# Patient Record
Sex: MALE | Employment: OTHER | URBAN - METROPOLITAN AREA
[De-identification: names, ages, dates, MRNs, and addresses within clinical notes are randomized per-mention and may not be internally consistent; named-entity substitution may affect disease eponyms.]

---

## 2019-01-25 LAB
ANION GAP SERPL CALCULATED.3IONS-SCNC: 3 MMOL/L (ref 3–14)
BUN SERPL-MCNC: 55 MG/DL (ref 7–30)
CALCIUM SERPL-MCNC: 8.4 MG/DL (ref 8.5–10.1)
CHLORIDE SERPL-SCNC: 96 MMOL/L (ref 94–109)
CO2 SERPL-SCNC: 36 MMOL/L (ref 20–32)
CREAT SERPL-MCNC: 1.99 MG/DL (ref 0.66–1.25)
GFR SERPL CREATININE-BSD FRML MDRD: 33 ML/MIN/{1.73_M2}
GLUCOSE SERPL-MCNC: 309 MG/DL (ref 70–99)
POTASSIUM SERPL-SCNC: 4.4 MMOL/L (ref 3.4–5.3)
SODIUM SERPL-SCNC: 135 MMOL/L (ref 133–144)

## 2019-01-25 PROCEDURE — 80048 BASIC METABOLIC PNL TOTAL CA: CPT | Performed by: FAMILY MEDICINE

## 2019-03-06 LAB — INR PPP: 3.37 (ref 0.86–1.14)

## 2019-03-06 PROCEDURE — 85610 PROTHROMBIN TIME: CPT | Performed by: FAMILY MEDICINE

## 2020-02-11 VITALS
SYSTOLIC BLOOD PRESSURE: 136 MMHG | OXYGEN SATURATION: 90 % | HEART RATE: 74 BPM | TEMPERATURE: 98.1 F | WEIGHT: 315 LBS | RESPIRATION RATE: 20 BRPM | DIASTOLIC BLOOD PRESSURE: 74 MMHG

## 2020-02-11 RX ORDER — ROPINIROLE 0.25 MG/1
0.25 TABLET, FILM COATED ORAL AT BEDTIME
COMMUNITY

## 2020-02-11 RX ORDER — HYDRALAZINE HYDROCHLORIDE 10 MG/1
20 TABLET, FILM COATED ORAL 3 TIMES DAILY
COMMUNITY

## 2020-02-11 RX ORDER — ESOMEPRAZOLE MAGNESIUM 40 MG/1
40 CAPSULE, DELAYED RELEASE ORAL 2 TIMES DAILY
COMMUNITY
End: 2020-02-27

## 2020-02-11 RX ORDER — INSULIN LISPRO 100 U/ML
INJECTION, SOLUTION SUBCUTANEOUS
COMMUNITY

## 2020-02-11 RX ORDER — ALLOPURINOL 100 MG/1
300 TABLET ORAL DAILY
COMMUNITY

## 2020-02-11 RX ORDER — TORSEMIDE 20 MG/1
80 TABLET ORAL DAILY
COMMUNITY
End: 2020-02-14

## 2020-02-11 RX ORDER — INSULIN LISPRO 100 [IU]/ML
55 INJECTION, SUSPENSION SUBCUTANEOUS EVERY EVENING
COMMUNITY

## 2020-02-11 RX ORDER — GABAPENTIN 100 MG/1
100 CAPSULE ORAL 3 TIMES DAILY
COMMUNITY

## 2020-02-11 RX ORDER — INSULIN LISPRO 100 [IU]/ML
160 INJECTION, SUSPENSION SUBCUTANEOUS EVERY MORNING
COMMUNITY

## 2020-02-11 RX ORDER — LIDOCAINE 4 G/G
PATCH TOPICAL
COMMUNITY

## 2020-02-11 RX ORDER — CARVEDILOL 12.5 MG/1
25 TABLET ORAL 2 TIMES DAILY WITH MEALS
COMMUNITY

## 2020-02-11 RX ORDER — MOMETASONE FUROATE 1 MG/G
CREAM TOPICAL DAILY
COMMUNITY

## 2020-02-11 RX ORDER — ISOSORBIDE MONONITRATE 120 MG/1
120 TABLET, EXTENDED RELEASE ORAL EVERY MORNING
COMMUNITY

## 2020-02-11 RX ORDER — EZETIMIBE 10 MG/1
10 TABLET ORAL EVERY MORNING
COMMUNITY

## 2020-02-11 RX ORDER — OXYCODONE HCL 10 MG/1
10 TABLET, FILM COATED, EXTENDED RELEASE ORAL EVERY 12 HOURS
COMMUNITY
End: 2020-02-17

## 2020-02-11 RX ORDER — INSULIN LISPRO 100 [IU]/ML
65 INJECTION, SUSPENSION SUBCUTANEOUS EVERY EVENING
COMMUNITY

## 2020-02-11 RX ORDER — DULOXETIN HYDROCHLORIDE 60 MG/1
60 CAPSULE, DELAYED RELEASE ORAL 2 TIMES DAILY
COMMUNITY

## 2020-02-11 RX ORDER — ATORVASTATIN CALCIUM 80 MG/1
80 TABLET, FILM COATED ORAL DAILY
COMMUNITY

## 2020-02-11 RX ORDER — SENNOSIDES 8.6 MG
2 TABLET ORAL 2 TIMES DAILY
COMMUNITY

## 2020-02-11 RX ORDER — INSULIN LISPRO 100 [IU]/ML
90 INJECTION, SUSPENSION SUBCUTANEOUS EVERY MORNING
COMMUNITY
End: 2020-02-20

## 2020-02-11 NOTE — PROGRESS NOTES
[facility]  PRIMARY CARE PROVIDER AND CLINIC:  Amaya Dawkins MD, MD, [facility] [address] [facility] / [facility]*  Chief Complaint   Patient presents with     Hospital F/U     [facility] Record Number:  [id number]  Place of Service where encounter took place:  [facility] [[id number]]    Valdo Oneill Jr.  is a 69 year old  (1950), admitted to the above facility from  [facility] . Hospital stay 1/23/2020 through 2/10/2020..  Admitted to this facility for  rehab, medical management and nursing care.    HPI:    HPI information obtained from: facility staff, patient report and [facility] chart review.   Brief Summary of Hospital Course:   - Pt with pMH notable for diastolic CHF hospitalized for NSTEMI and acute on chronic CHF diuresed, noted elevated trop, and NSTEMI but felt to be 2/2 demand ischemia, required coronary angiogram followed by stenting x1 to RCA. Losartan and diltiazem stopped.   - complicated with acute on chronic respiratory failure, delirium (2/2 to respiratory condition and / or opioid use), improved.   - Had proteus UTI ,treated with cipro x 5 days.   - hypoglycemia, insulin adjusted, then titrated up back.     Updates on Status Since Skilled nursing Admission:   - Patient was seen and examined today.  Denies orthopnea, PND, chest pain, shortness of breath.  Denies palpitation.  Reports uses Trilogy at home.  Reports he had 17 stents so far.   -RN reports blood glucose today is 450.  Patient denies any Pradaxa, Mccarty UTI, polyphagia.  Reports he takes 1 4 units of insulin in the morning and 125 at noon and 125 at suppertime.    CODE STATUS/ADVANCE DIRECTIVES DISCUSSION:   DNR only  Patient's living condition: lives alone  ALLERGIES: Ceftriaxone and Morphine   Surgical History    Surgery Date Site/Laterality Comments   CABG 1/1/2007 - 12/31/2007        LAPAROSCOPIC ADJUSTABLE GASTRIC BAND 9/17/09    Realize band - Dr. Ferro     PTCA 2005, 2006, 2008, 8/2010   14 stents total     ESOPHAGOGASTRODUODENOSCOPY 10/1/12        Laparoscopic removal of lap band and adjusting port. 1/7/13   Dr. Lewis     OPEN REDUCTION INTERNAL FIXATION ANKLE 10/24/2013  Left      arm avf 1/1/2013 - 12/31/2013 Left      IMO DENTAL SURGERY PROCEDURE 1/1/2014 - 12/31/2014   extractions     ESOPHAGOGASTRODUODENOSCOPY 07/06/2017   with Botox       Medical History    Medical History Date Comments   Pneumonia in other infectious diseases classified elsewhere(484.8) 2006     Coronary atherosclerosis of unspecified type of vessel, native or graft   cabg 2007, neg angio 2/09, stents 8/10, no new stents 4/11   Obstructive sleep apnea (adult) (pediatric)   was on CPAP, then BIPAP, now Trilogy NIV (AVAPS AE)   Mixed hyperlipidemia       Lumbago       Unspecified essential hypertension       Diabetes mellitus (HC)   IDDM   LVH (left ventricular hypertrophy)       Ascending aorta dilatation (HC)   4.5 cm on ECHO 2/16/12   CVA (cerebral infarction)   on MRI 2/10   Diastolic dysfunction, left ventricle   grade II 2/16/12 ECHO   Closed fracture of lower leg 10/17/2013     Obesity, morbid (HC) 10/17/2013     Dermatitis       CKD (chronic kidney disease) stage 4, GFR 15-29 ml/min (HC)   likely underlying diabetic nephropathy + cardiorenal syndrome (also had abrupt worsening in 2007 following CABG raising concerns of cholesterol emboli at that time)   AVF (arteriovenous fistula) (HC)   Left Upper Arm created 10/2013   Myocardial infarction (HC)       Stroke (HC)       CAD (coronary artery disease)       Arthritis       PFO (patent foramen ovale)       Gout       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/2011     Esophagitis       Skin ulcer due to diabetes mellitus (HC)   left heel   CHF (congestive heart failure) (HC)       Closed nondisplaced fracture of head of right radius with delayed healing 11/30/2017     Intermittent dysphagia 5/15/2017     Hx of BKA, left (HC)  2018     MRSA (methicillin resistant Staphylococcus aureus) 2019 & 18 right ear, 18 left knee      Family History    Medical History Relation Name Comments   Other Brother   has 14 siblings, many with diabetes   Good Health Daughter Beatriz     Other Daughter Ellie mental health, addiction issues   Good Health Daughter Mitzi     Stroke Father       Good Health Grandchild   x7   Other Mother   PE age 49   Other Other   nephew uses CPAP   Heart Disease Sister Tova     Sleep apnea Sister Tova uses CPAP   Other Son Frankie coarctation of aorta, blindness, CP, autism     Relation Name Status Comments   Brother         Daughter Beatriz       Daughter Ellie       Daughter Mitzi       Father    (Age 75) Stroke   Grandchild         Mother    (Age 49) Blood clot   Other         Sister Tova Alive     Son Frankie         Social History    Tobacco Use Types Packs/Day Years Used Date   Former Smoker Cigarettes, Pipe 2 13 Quit: 1982   Smokeless Tobacco: Never Used           Tobacco Cessation: Counseling Given: No  Comments: quit in his 30s     Alcohol Use Drinks/Week oz/Week Comments   No 0 Standard drinks or equivalent   0.0 quit drinking in 30s       Post Discharge Medication Reconciliation Status: discharge medications reconciled and changed, per note/orders (see AVS)  Current Outpatient Medications   Medication Sig Dispense Refill     Acetaminophen (TYLENOL PO) Take 650 mg by mouth every 4 hours as needed        allopurinol (ZYLOPRIM) 100 MG tablet Take 300 mg by mouth daily       atorvastatin (LIPITOR) 80 MG tablet Take 80 mg by mouth daily       carvedilol (COREG) 12.5 MG tablet Take 25 mg by mouth 2 times daily (with meals)       cholecalciferol (VITAMIN D3) 5000 UNITS CAPS capsule Take by mouth daily       clopidogrel (PLAVIX) 75 MG tablet Take 75 mg by mouth daily        DULoxetine (CYMBALTA) 60 MG capsule Take 60 mg by mouth 2 times daily       esomeprazole (NEXIUM) 40 MG  DR capsule Take 40 mg by mouth 2 times daily Take 30-60 minutes before eating.       ezetimibe (ZETIA) 10 MG tablet Take 10 mg by mouth every morning       gabapentin (NEURONTIN) 100 MG capsule Take 100 mg by mouth 3 times daily       hydrALAZINE (APRESOLINE) 10 MG tablet Take 20 mg by mouth 3 times daily       hypromellose (ARTIFICIAL TEARS) 0.5 % SOLN ophthalmic solution 1 drop every 2 hours as needed for dry eyes       insulin lispro (ADMELOG SOLOSTAR) 100 UNIT/ML pen Inject Subcutaneous 3 times daily (before meals) Inject as per sliding scale: if 70 - 149 = 0 <70 see hypoglycemia protocol; 150 - 199 = 3; 200 - 249 = 6; 250 - 299 = 9; 300 - 349 = 12; 350 - 399 = 15; 400 - 999 = 18 400 or > give 18 units and call MD, subcutaneously before meals       insulin lispro prot & lispro (HUMALOG MIX 75/25 KWIKPEN) (75-25) 100 UNIT/ML pen Inject 65 Units Subcutaneous every evening       insulin lispro prot & lispro (HUMALOG MIX 75/25 KWIKPEN) (75-25) 100 UNIT/ML pen Inject 55 Units Subcutaneous every evening       insulin lispro prot & lispro (HUMALOG MIX 75/25 KWIKPEN) (75-25) 100 UNIT/ML pen Inject 70 Units Subcutaneous every morning       insulin lispro prot & lispro (HUMALOG MIX 75/25 KWIKPEN) (75-25) 100 UNIT/ML pen Inject 90 Units Subcutaneous every morning       isosorbide mononitrate CR (IMDUR) 120 MG 24 HR ER tablet Take 120 mg by mouth every morning       Lidocaine (LIDOCARE) 4 % Patch Place 1 patch onto the skin every 24 hours To prevent lidocaine toxicity, patient should be patch free for 12 hrs daily.       mometasone (ELOCON) 0.1 % external cream Apply topically daily Apply to external ears topically as needed       nitroglycerin (NITROSTAT) 0.4 MG SL tablet Place under the tongue every 5 minutes as needed       oxyCODONE (OXYCONTIN) 10 MG 12 hr tablet Take 10 mg by mouth every 12 hours       oxyCODONE HCl (OXAYDO) 5 MG TABA Take 15 mg by mouth every 4 hours as needed       Polyethylene Glycol 3350 (MIRALAX  "PO) Take 17 g by mouth 2 times daily as needed        rOPINIRole (REQUIP) 0.25 MG tablet Take 0.25 mg by mouth At Bedtime       sennosides (SENOKOT) 8.6 MG tablet Take 2 tablets by mouth 2 times daily       torsemide (DEMADEX) 20 MG tablet Take 80 mg by mouth daily       vitamin B-12 (CYANOCOBALAMIN) 500 MCG tablet Take 500 mcg by mouth every morning       Warfarin Sodium (COUMADIN PO) Take 1.25 mg by mouth At Bedtime       AMOXICILLIN PO Take 500 mg by mouth Take 4 capsules 60 minutes prior to procedure       aspirin 81 MG tablet Take by mouth daily       bisacodyl (DULCOLAX) 10 MG suppository Place 10 mg rectally daily as needed       desonide (DESOWEN) 0.05 % lotion Apply topically 2 times daily       gabapentin (NEURONTIN) 600 MG tablet Take 900 mg by mouth 3 times daily       insulin reg HIGH CONC (HUMULIN R U-500 HIGH CONC VIAL) 500 Units/mL soln Inject 36 units in am and 32 units in evening by subcutaneous route (400 gives 200 active u)       insulin syringe-needle U-100 (ULTICARE INSULIN SYRINGE) 30G X 1/2\" 0.5 ML Use 3 syringes daily or as directed.       LORazepam (ATIVAN) 1 MG tablet Take 1 mg by mouth every 6 hours as needed       Menthol, Topical Analgesic, (ICY HOT EX)        metoprolol (LOPRESSOR) 25 MG tablet Take 0.5 tablets by mouth daily       morphine (MSIR) 15 MG tablet Take 15 mg by mouth 2 times daily       nystatin (MYCOSTATIN) cream Apply topically 2 times daily       Omega-3 Fatty Acids (OMEGA-3 PO) Take 500 mg by mouth 2 times daily       omeprazole (PRILOSEC) 40 MG capsule Take by mouth daily       ORDER FOR DME Oxygen-2 liters per minute at bedtime for BIPAP       ORDER FOR DME BiPap-as directed       oxyCODONE (OXYCONTIN) 20 MG 12 hr tablet Take by mouth every 12 hours       oxyCODONE-acetaminophen (PERCOCET) 5-325 MG per tablet Take by mouth every 6 hours as needed       Pediatric Multiple Vit-C-FA (FLINSTONES GUMMIES OMEGA-3 DHA PO) Take by mouth daily       pimecrolimus (ELIDEL) 1 % " cream Apply topically 2 times daily       potassium chloride SA (K-DUR,KLOR-CON M) 20 MEQ tablet Take by mouth 2 times daily       pramipexole (MIRAPEX) 0.125 MG tablet Take 1 tablet by mouth nightly as needed       rosuvastatin (CRESTOR) 40 MG tablet Take by mouth daily       solifenacin (VESICARE) 10 MG tablet Take by mouth daily       spironolactone (ALDACTONE) 25 MG tablet Take 1 tablet by mouth 2 times daily       sucralfate (CARAFATE) 1 GM tablet Take 1 tablet by mouth 2 times daily       sucralfate (CARAFATE) 1 GM tablet Take 1 tablet by mouth 2 times daily       terbinafine (LAMISIL) 250 MG tablet Take 250 mg by mouth daily       Thiamine Mononitrate (VITAMIN B1) 100 MG TABS Take 1 tablet by mouth daily       torsemide (DEMADEX) 20 MG tablet Take 2 tablets by mouth daily       ROS:  10 point ROS of systems including Constitutional, Eyes, Respiratory, Cardiovascular, Gastroenterology, Genitourinary, Integumentary, Musculoskeletal, Psychiatric were all negative except for pertinent positives noted in my HPI.    Vitals:  /74   Pulse 74   Temp 98.1  F (36.7  C)   Resp 20   Wt (!) 163.4 kg (360 lb 3.2 oz)   SpO2 90%   Exam:  GENERAL APPEARANCE:  in no distress, cooperative, morbidly obese.   ENT:  Mouth and posterior oropharynx normal, moist mucous membranes, oral mucosa moist, no lesion noted.   EYES:  EOMI, Pupil rounded and equal.  RESP:  lungs clear to auscultation   CV:  S1S2 audible, regular HR, no murmur appreciated.  +1 pitting pedal edema  ABDOMEN:  soft, NT/ND, BS audible. no mass appreciated on palpation.   M/S:   no joint deformity noted on observation. Fistula palpable over left antecubital fossa. Rt BKA.   SKIN:  No rash.   NEURO:   No NFD appreciated on observation. Hand  5/5 b/l  PSYCH:  normal insight, judgement and memory, affect and mood normal    Lab/Diagnostic data: .Reviewed in the chart and EHR.      TTE, 1/24/20  Final Impressions:  Limited Echocardiogram performed  1.  Technically difficult exam. Pt requested that the study be terminated prior to its completion  2. Mild to moderately increased LV size, moderately increased wall thickness, normal global systolic function with an estimated EF of 60 - 65%.  3. Mildly enlarged left atrium.  4. The ascending aorta is dilated with a maximal diameter of 3.9 cm.  5. The aortic sinus is dilated with a maximal diameter of 4.6 cm. 4.4 cm on the prior exam 6/1/19.  ______________________    PROCEDURES PERFORMED DURING THIS ADMISSION   Coronary angiogram, 2/2/2020  DIAGNOSTIC SUMMARY    The LMCA has diffuse luminal irregularities.    The LAD is totally occluded.    The Circumflex is non-dominant and is severely diseased.80% stenosis in the 1st Marginal    The RCA is dominant and has re stenotic stent(s) from a previous procedure.99% stenosis in the Proximal RCA (Restenosis - Drug Eluting Stent)    The LIMA graft from the LIMA to the Mid LAD was not visualized during non-selective injection of L subclavian. Unable to selectively engage due to tortuosity. Consider CTA to assess patency of LIMA graft     The SVG graft from the Aorta to the 3rd Marginal has severe degenerative changes. 90% stenosis in the Aorta graft to 3rd Marginal     INTERVENTION    Successful 3mm x 12 mm Balloon and 3.5mm x 12 mm Balloon to Proximal RCA, post stenosis 30%    Successful 3.0 x 18 mm Xience FAINA implantation to SVG to OM3, post deployed to 3.5 mm with NC balloon   RECOMMENDATIONS & PLAN  * Medical Rx - Aspirin: 81 mg daily for 7 days post PCI. OK to restart warfarin today   * Plavix Indefinitely  * Optimize risk factors and medications  * Consider brachytherapy of RCA and staged PCI of OM1.   * CTA to assess patency of LIMA graft  ----------------------------------------------------------------------------------------------------------------------------------------------------------------------------------    ASSESSMENT/PLAN:    Acute on chronic diastolic heart  failure (H)  NSTEMI (H) likely demand ischemia, angiogram revealed multiple ischemic areas, s/p stenting x1 to RCA. Noted to have left circumflex lesion of which brachytherapy followed by percutaneous intervention will be considered if symptoms redevelop  ASCVD (H) s/p CABG 2007 and stenting to LAD + RCA 1/14/19  Other and unspecified hyperlipidemia  - compensating, on torsemide, Aldactone.   -  Losartan and diltiazem were stopped.   - on ASA (given for 7 days) , ezetimibe 10 mg, lipitor 80 mg, coreg 25 mg bid, plavix (indefinitely)  - Will need to follow with [facility] heart failure team  - high risk for re-admission, follow closely.       Paroxysmal atrial fibrillation (HC)  - Anticoagulation monitoring, INR range 2-3  - PFO (patent foramen ovale)  - Echo with grossly NL EF- see above for detail.   - INR today 3.1 from 3.1 (yesterday), was on 1.25 mg on Feb 10th, and 1 mg on 2/11. Will continue one mg tonight and tomorrow, recheck in 2 days.       Diabetes mellitus with other circulatroy complication, with long-term current nickolas of insulin (HC)  - No results found for: A1C  - hypoglycemia while hospitalized, insuline reduced, then titrated up.   - on humalog mix and SSI. Monitor closely and adjust an needed. Recommended level is 7-8%. At home used to take 140 Units in am, and 125 units at lunch and 125 units at dinner time.   - on SSI, titrate up insulin as needed with a goal to stop SSI eventually.       Chronic hypercapnic respiratory insufficiency (H)  Morbid obesity with BMI of 45.0-49.9, adult (HC)  MADAY treated with BiPAP  Restrictive lung disease secondary to obesity   - back to baseline. Continue to monitor.   - counseled on cutting down on carbs.       Depression, major, recurrent, moderate (HC): stable.     CKD  Stage 4, GFR 26 ml/min on Jan 23rd (HC)  - LUE AV fistula in place over left antecubital fossa- has been there for years. NOT on HD.   - - Avoid nephrotoxic drugs. Renal dose the medications. Monitor BMP  closely.       Hx of R BKA  (H)  Chronic opioid use  - on gabapentin 100 mg tid,  However GFR is < 50 ml/min, will change to 150 mg bid  - on OxyContin and oxycodone.  Analgesia optimal.     Gout: no flare up. On allopurinol 300 mg.   Restless legs syndrome (RLS): no concern.  On ropinirole.  GERD: Lisinopril 40 mg  twice daily. High dose. No hx of GIB in Epic.     transcribed by : Nilam Loza  Orders:  1. Discontinue Neurontin 100 mg TID   2. Start Neurontin 150 mg BID  3. BMP - dx: CKD/CHF  4. Coumadin 1 mg tonight and tomorrow then recheck INR on Friday      Total time spent with patient visit at the skilled nursing facility was 46 minutes including patient visit, review of past records and completing documentation including A/P. Greater than 50% of total time spent with counseling, discussing plan of stay, discussing above A/P,  and coordinating care with nursing staff due to above ongoing medical conditions.        Electronically signed by:  Cece Blandon MD

## 2020-02-12 ENCOUNTER — TELEPHONE (OUTPATIENT)
Dept: GERIATRICS | Facility: CLINIC | Age: 70
End: 2020-02-12

## 2020-02-12 ENCOUNTER — NURSING HOME VISIT (OUTPATIENT)
Dept: GERIATRICS | Facility: CLINIC | Age: 70
End: 2020-02-12
Payer: COMMERCIAL

## 2020-02-12 DIAGNOSIS — J98.4 RESTRICTIVE LUNG DISEASE SECONDARY TO OBESITY: ICD-10-CM

## 2020-02-12 DIAGNOSIS — E66.9 RESTRICTIVE LUNG DISEASE SECONDARY TO OBESITY: ICD-10-CM

## 2020-02-12 DIAGNOSIS — G47.33 OSA (OBSTRUCTIVE SLEEP APNEA): ICD-10-CM

## 2020-02-12 DIAGNOSIS — Z79.4 TYPE 2 DIABETES MELLITUS WITH OTHER CIRCULATORY COMPLICATION, WITH LONG-TERM CURRENT USE OF INSULIN (H): ICD-10-CM

## 2020-02-12 DIAGNOSIS — I21.4 NSTEMI (NON-ST ELEVATED MYOCARDIAL INFARCTION) (H): ICD-10-CM

## 2020-02-12 DIAGNOSIS — E11.59 TYPE 2 DIABETES MELLITUS WITH OTHER CIRCULATORY COMPLICATION, WITH LONG-TERM CURRENT USE OF INSULIN (H): ICD-10-CM

## 2020-02-12 DIAGNOSIS — F11.90 CHRONIC, CONTINUOUS USE OF OPIOIDS: ICD-10-CM

## 2020-02-12 DIAGNOSIS — J96.12 CHRONIC RESPIRATORY FAILURE WITH HYPERCAPNIA (H): ICD-10-CM

## 2020-02-12 DIAGNOSIS — N18.4 CKD (CHRONIC KIDNEY DISEASE) STAGE 4, GFR 15-29 ML/MIN (H): ICD-10-CM

## 2020-02-12 DIAGNOSIS — E66.01 MORBID OBESITY (H): ICD-10-CM

## 2020-02-12 DIAGNOSIS — I50.33 ACUTE ON CHRONIC DIASTOLIC CONGESTIVE HEART FAILURE (H): Primary | ICD-10-CM

## 2020-02-12 DIAGNOSIS — I48.0 PAROXYSMAL ATRIAL FIBRILLATION (H): ICD-10-CM

## 2020-02-12 PROCEDURE — 99306 1ST NF CARE HIGH MDM 50: CPT | Performed by: FAMILY MEDICINE

## 2020-02-12 NOTE — LETTER
2/12/2020        RE: Valdo Oneill Jr.  [address] [facility] [address]        [facility]  PRIMARY CARE PROVIDER AND CLINIC:  Amaya Dawkins MD, MD, [facility] [address] [place] [address]*  Chief Complaint   Patient presents with     Hospital F/U     [facility] Record Number:  [id number]  Place of Service where encounter took place:  [facility] ([facility]) [[id number]]    Valdo Oneill Jr.  is a 69 year old  (1950), admitted to the above facility from  [facility] . Hospital stay 1/23/2020 through 2/10/2020..  Admitted to this facility for  rehab, medical management and nursing care.    HPI:    HPI information obtained from: facility staff, patient report and [facility] chart review.   Brief Summary of Hospital Course:   - Pt with pMH notable for diastolic CHF hospitalized for NSTEMI and acute on chronic CHF diuresed, noted elevated trop, and NSTEMI but felt to be 2/2 demand ischemia, required coronary angiogram followed by stenting x1 to RCA. Losartan and diltiazem stopped.   - complicated with acute on chronic respiratory failure, delirium (2/2 to respiratory condition and / or opioid use), improved.   - Had proteus UTI ,treated with cipro x 5 days.   - hypoglycemia, insulin adjusted, then titrated up back.     Updates on Status Since Skilled nursing Admission:   - Patient was seen and examined today.  Denies orthopnea, PND, chest pain, shortness of breath.  Denies palpitation.  Reports uses Trilogy at home.  Reports he had 17 stents so far.   -RN reports blood glucose today is 450.  Patient denies any Pradaxa, Mccarty UTI, polyphagia.  Reports he takes 1 4 units of insulin in the morning and 125 at noon and 125 at suppertime.    CODE STATUS/ADVANCE DIRECTIVES DISCUSSION:   DNR only  Patient's living condition: lives alone  ALLERGIES: Ceftriaxone and Morphine   Surgical History    Surgery Date  Site/Laterality Comments   CABG 1/1/2007 - 12/31/2007        LAPAROSCOPIC ADJUSTABLE GASTRIC BAND 9/17/09   Realize band - Dr. Ferro     PTCA 2005, 2006, 2008, 8/2010   14 stents total     ESOPHAGOGASTRODUODENOSCOPY 10/1/12        Laparoscopic removal of lap band and adjusting port. 1/7/13   Dr. Lewis     OPEN REDUCTION INTERNAL FIXATION ANKLE 10/24/2013  Left      arm avf 1/1/2013 - 12/31/2013 Left      IMO DENTAL SURGERY PROCEDURE 1/1/2014 - 12/31/2014   extractions     ESOPHAGOGASTRODUODENOSCOPY 07/06/2017   with Botox       Medical History    Medical History Date Comments   Pneumonia in other infectious diseases classified elsewhere(484.8) 2006     Coronary atherosclerosis of unspecified type of vessel, native or graft   cabg 2007, neg angio 2/09, stents 8/10, no new stents 4/11   Obstructive sleep apnea (adult) (pediatric)   was on CPAP, then BIPAP, now Trilogy NIV (AVAPS AE)   Mixed hyperlipidemia       Lumbago       Unspecified essential hypertension       Diabetes mellitus (HC)   IDDM   LVH (left ventricular hypertrophy)       Ascending aorta dilatation (HC)   4.5 cm on ECHO 2/16/12   CVA (cerebral infarction)   on MRI 2/10   Diastolic dysfunction, left ventricle   grade II 2/16/12 ECHO   Closed fracture of lower leg 10/17/2013     Obesity, morbid (HC) 10/17/2013     Dermatitis       CKD (chronic kidney disease) stage 4, GFR 15-29 ml/min (HC)   likely underlying diabetic nephropathy + cardiorenal syndrome (also had abrupt worsening in 2007 following CABG raising concerns of cholesterol emboli at that time)   AVF (arteriovenous fistula) (HC)   Left Upper Arm created 10/2013   Myocardial infarction (HC)       Stroke (HC)       CAD (coronary artery disease)       Arthritis       PFO (patent foramen ovale)       Gout       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/2011     Esophagitis       Skin ulcer due to diabetes mellitus (HC)   left heel   CHF (congestive heart failure) (HC)       Closed nondisplaced fracture of head of  right radius with delayed healing 2017     Intermittent dysphagia 5/15/2017     Hx of BKA, left (HC) 2018     MRSA (methicillin resistant Staphylococcus aureus) 2019 & 18 right ear, 18 left knee      Family History    Medical History Relation Name Comments   Other Brother   has 14 siblings, many with diabetes   Good Health Daughter Beatriz     Other Daughter Ellie mental health, addiction issues   Good Health Daughter Mitzi     Stroke Father       Good Health Grandchild   x7   Other Mother   PE age 49   Other Other   nephew uses CPAP   Heart Disease Sister Tova     Sleep apnea Sister Tova uses CPAP   Other Son Frankie coarctation of aorta, blindness, CP, autism     Relation Name Status Comments   Brother         Daughter Beatriz       Daughter Ellie       Daughter Mitzi       Father    (Age 75) Stroke   Grandchild         Mother    (Age 49) Blood clot   Other         Sister Tova Alive     Son Frankie         Social History    Tobacco Use Types Packs/Day Years Used Date   Former Smoker Cigarettes, Pipe 2 13 Quit: 1982   Smokeless Tobacco: Never Used           Tobacco Cessation: Counseling Given: No  Comments: quit in his 30s     Alcohol Use Drinks/Week oz/Week Comments   No 0 Standard drinks or equivalent   0.0 quit drinking in 30s       Post Discharge Medication Reconciliation Status: discharge medications reconciled and changed, per note/orders (see AVS)  Current Outpatient Medications   Medication Sig Dispense Refill     Acetaminophen (TYLENOL PO) Take 650 mg by mouth every 4 hours as needed        allopurinol (ZYLOPRIM) 100 MG tablet Take 300 mg by mouth daily       atorvastatin (LIPITOR) 80 MG tablet Take 80 mg by mouth daily       carvedilol (COREG) 12.5 MG tablet Take 25 mg by mouth 2 times daily (with meals)       cholecalciferol (VITAMIN D3) 5000 UNITS CAPS capsule Take by mouth daily       clopidogrel (PLAVIX) 75 MG tablet Take 75 mg by mouth daily         DULoxetine (CYMBALTA) 60 MG capsule Take 60 mg by mouth 2 times daily       esomeprazole (NEXIUM) 40 MG DR capsule Take 40 mg by mouth 2 times daily Take 30-60 minutes before eating.       ezetimibe (ZETIA) 10 MG tablet Take 10 mg by mouth every morning       gabapentin (NEURONTIN) 100 MG capsule Take 100 mg by mouth 3 times daily       hydrALAZINE (APRESOLINE) 10 MG tablet Take 20 mg by mouth 3 times daily       hypromellose (ARTIFICIAL TEARS) 0.5 % SOLN ophthalmic solution 1 drop every 2 hours as needed for dry eyes       insulin lispro (ADMELOG SOLOSTAR) 100 UNIT/ML pen Inject Subcutaneous 3 times daily (before meals) Inject as per sliding scale: if 70 - 149 = 0 <70 see hypoglycemia protocol; 150 - 199 = 3; 200 - 249 = 6; 250 - 299 = 9; 300 - 349 = 12; 350 - 399 = 15; 400 - 999 = 18 400 or > give 18 units and call MD, subcutaneously before meals       insulin lispro prot & lispro (HUMALOG MIX 75/25 KWIKPEN) (75-25) 100 UNIT/ML pen Inject 65 Units Subcutaneous every evening       insulin lispro prot & lispro (HUMALOG MIX 75/25 KWIKPEN) (75-25) 100 UNIT/ML pen Inject 55 Units Subcutaneous every evening       insulin lispro prot & lispro (HUMALOG MIX 75/25 KWIKPEN) (75-25) 100 UNIT/ML pen Inject 70 Units Subcutaneous every morning       insulin lispro prot & lispro (HUMALOG MIX 75/25 KWIKPEN) (75-25) 100 UNIT/ML pen Inject 90 Units Subcutaneous every morning       isosorbide mononitrate CR (IMDUR) 120 MG 24 HR ER tablet Take 120 mg by mouth every morning       Lidocaine (LIDOCARE) 4 % Patch Place 1 patch onto the skin every 24 hours To prevent lidocaine toxicity, patient should be patch free for 12 hrs daily.       mometasone (ELOCON) 0.1 % external cream Apply topically daily Apply to external ears topically as needed       nitroglycerin (NITROSTAT) 0.4 MG SL tablet Place under the tongue every 5 minutes as needed       oxyCODONE (OXYCONTIN) 10 MG 12 hr tablet Take 10 mg by mouth every 12 hours       oxyCODONE  "HCl (OXAYDO) 5 MG TABA Take 15 mg by mouth every 4 hours as needed       Polyethylene Glycol 3350 (MIRALAX PO) Take 17 g by mouth 2 times daily as needed        rOPINIRole (REQUIP) 0.25 MG tablet Take 0.25 mg by mouth At Bedtime       sennosides (SENOKOT) 8.6 MG tablet Take 2 tablets by mouth 2 times daily       torsemide (DEMADEX) 20 MG tablet Take 80 mg by mouth daily       vitamin B-12 (CYANOCOBALAMIN) 500 MCG tablet Take 500 mcg by mouth every morning       Warfarin Sodium (COUMADIN PO) Take 1.25 mg by mouth At Bedtime       AMOXICILLIN PO Take 500 mg by mouth Take 4 capsules 60 minutes prior to procedure       aspirin 81 MG tablet Take by mouth daily       bisacodyl (DULCOLAX) 10 MG suppository Place 10 mg rectally daily as needed       desonide (DESOWEN) 0.05 % lotion Apply topically 2 times daily       gabapentin (NEURONTIN) 600 MG tablet Take 900 mg by mouth 3 times daily       insulin reg HIGH CONC (HUMULIN R U-500 HIGH CONC VIAL) 500 Units/mL soln Inject 36 units in am and 32 units in evening by subcutaneous route (400 gives 200 active u)       insulin syringe-needle U-100 (ULTICARE INSULIN SYRINGE) 30G X 1/2\" 0.5 ML Use 3 syringes daily or as directed.       LORazepam (ATIVAN) 1 MG tablet Take 1 mg by mouth every 6 hours as needed       Menthol, Topical Analgesic, (ICY HOT EX)        metoprolol (LOPRESSOR) 25 MG tablet Take 0.5 tablets by mouth daily       morphine (MSIR) 15 MG tablet Take 15 mg by mouth 2 times daily       nystatin (MYCOSTATIN) cream Apply topically 2 times daily       Omega-3 Fatty Acids (OMEGA-3 PO) Take 500 mg by mouth 2 times daily       omeprazole (PRILOSEC) 40 MG capsule Take by mouth daily       ORDER FOR DME Oxygen-2 liters per minute at bedtime for BIPAP       ORDER FOR DME BiPap-as directed       oxyCODONE (OXYCONTIN) 20 MG 12 hr tablet Take by mouth every 12 hours       oxyCODONE-acetaminophen (PERCOCET) 5-325 MG per tablet Take by mouth every 6 hours as needed       Pediatric " Multiple Vit-C-FA (FLINSTONES GUMMIES OMEGA-3 DHA PO) Take by mouth daily       pimecrolimus (ELIDEL) 1 % cream Apply topically 2 times daily       potassium chloride SA (K-DUR,KLOR-CON M) 20 MEQ tablet Take by mouth 2 times daily       pramipexole (MIRAPEX) 0.125 MG tablet Take 1 tablet by mouth nightly as needed       rosuvastatin (CRESTOR) 40 MG tablet Take by mouth daily       solifenacin (VESICARE) 10 MG tablet Take by mouth daily       spironolactone (ALDACTONE) 25 MG tablet Take 1 tablet by mouth 2 times daily       sucralfate (CARAFATE) 1 GM tablet Take 1 tablet by mouth 2 times daily       sucralfate (CARAFATE) 1 GM tablet Take 1 tablet by mouth 2 times daily       terbinafine (LAMISIL) 250 MG tablet Take 250 mg by mouth daily       Thiamine Mononitrate (VITAMIN B1) 100 MG TABS Take 1 tablet by mouth daily       torsemide (DEMADEX) 20 MG tablet Take 2 tablets by mouth daily       ROS:  10 point ROS of systems including Constitutional, Eyes, Respiratory, Cardiovascular, Gastroenterology, Genitourinary, Integumentary, Musculoskeletal, Psychiatric were all negative except for pertinent positives noted in my HPI.    Vitals:  /74   Pulse 74   Temp 98.1  F (36.7  C)   Resp 20   Wt (!) 163.4 kg (360 lb 3.2 oz)   SpO2 90%   Exam:  GENERAL APPEARANCE:  in no distress, cooperative, morbidly obese.   ENT:  Mouth and posterior oropharynx normal, moist mucous membranes, oral mucosa moist, no lesion noted.   EYES:  EOMI, Pupil rounded and equal.  RESP:  lungs clear to auscultation   CV:  S1S2 audible, regular HR, no murmur appreciated.  +1 pitting pedal edema  ABDOMEN:  soft, NT/ND, BS audible. no mass appreciated on palpation.   M/S:   no joint deformity noted on observation. Fistula palpable over left antecubital fossa. Rt BKA.   SKIN:  No rash.   NEURO:   No NFD appreciated on observation. Hand  5/5 b/l  PSYCH:  normal insight, judgement and memory, affect and mood normal    Lab/Diagnostic data:  .Reviewed in the chart and EHR.      TTE, 1/24/20  Final Impressions:  Limited Echocardiogram performed  1. Technically difficult exam. Pt requested that the study be terminated prior to its completion  2. Mild to moderately increased LV size, moderately increased wall thickness, normal global systolic function with an estimated EF of 60 - 65%.  3. Mildly enlarged left atrium.  4. The ascending aorta is dilated with a maximal diameter of 3.9 cm.  5. The aortic sinus is dilated with a maximal diameter of 4.6 cm. 4.4 cm on the prior exam 6/1/19.  ______________________    PROCEDURES PERFORMED DURING THIS ADMISSION   Coronary angiogram, 2/2/2020  DIAGNOSTIC SUMMARY    The LMCA has diffuse luminal irregularities.    The LAD is totally occluded.    The Circumflex is non-dominant and is severely diseased.80% stenosis in the 1st Marginal    The RCA is dominant and has re stenotic stent(s) from a previous procedure.99% stenosis in the Proximal RCA (Restenosis - Drug Eluting Stent)    The LIMA graft from the LIMA to the Mid LAD was not visualized during non-selective injection of L subclavian. Unable to selectively engage due to tortuosity. Consider CTA to assess patency of LIMA graft     The SVG graft from the Aorta to the 3rd Marginal has severe degenerative changes. 90% stenosis in the Aorta graft to 3rd Marginal     INTERVENTION    Successful 3mm x 12 mm Balloon and 3.5mm x 12 mm Balloon to Proximal RCA, post stenosis 30%    Successful 3.0 x 18 mm Xience FAINA implantation to SVG to OM3, post deployed to 3.5 mm with NC balloon   RECOMMENDATIONS & PLAN  * Medical Rx - Aspirin: 81 mg daily for 7 days post PCI. OK to restart warfarin today   * Plavix Indefinitely  * Optimize risk factors and medications  * Consider brachytherapy of RCA and staged PCI of OM1.   * CTA to assess patency of LIMA  graft  ----------------------------------------------------------------------------------------------------------------------------------------------------------------------------------    ASSESSMENT/PLAN:    Acute on chronic diastolic heart failure (H)  NSTEMI (H) likely demand ischemia, angiogram revealed multiple ischemic areas, s/p stenting x1 to RCA. Noted to have left circumflex lesion of which brachytherapy followed by percutaneous intervention will be considered if symptoms redevelop  ASCVD (H) s/p CABG 2007 and stenting to LAD + RCA 1/14/19  Other and unspecified hyperlipidemia  - compensating, on torsemide, Aldactone.   -  Losartan and diltiazem were stopped.   - on ASA (given for 7 days) , ezetimibe 10 mg, lipitor 80 mg, coreg 25 mg bid, plavix (indefinitely)  - Will need to follow with [facility] heart failure team  - high risk for re-admission, follow closely.       Paroxysmal atrial fibrillation (HC)  - Anticoagulation monitoring, INR range 2-3  - PFO (patent foramen ovale)  - Echo with grossly NL EF- see above for detail.   - INR today 3.1 from 3.1 (yesterday), was on 1.25 mg on Feb 10th, and 1 mg on 2/11. Will continue one mg tonight and tomorrow, recheck in 2 days.       Diabetes mellitus with other circulatroy complication, with long-term current nickolas of insulin (HC)  - No results found for: A1C  - hypoglycemia while hospitalized, insuline reduced, then titrated up.   - on humalog mix and SSI. Monitor closely and adjust an needed. Recommended level is 7-8%. At home used to take 140 Units in am, and 125 units at lunch and 125 units at dinner time.   - on SSI, titrate up insulin as needed with a goal to stop SSI eventually.       Chronic hypercapnic respiratory insufficiency (H)  Morbid obesity with BMI of 45.0-49.9, adult (HC)  MADAY treated with BiPAP  Restrictive lung disease secondary to obesity   - back to baseline. Continue to monitor.   - counseled on cutting down on carbs.       Depression, major,  recurrent, moderate (HC): stable.     CKD  Stage 4, GFR 26 ml/min on Jan 23rd (HC)  - LUE AV fistula in place over left antecubital fossa- has been there for years. NOT on HD.   - - Avoid nephrotoxic drugs. Renal dose the medications. Monitor BMP closely.       Hx of R BKA  (H)  Chronic opioid use  - on gabapentin 100 mg tid,  However GFR is < 50 ml/min, will change to 150 mg bid  - on OxyContin and oxycodone.  Analgesia optimal.     Gout: no flare up. On allopurinol 300 mg.   Restless legs syndrome (RLS): no concern.  On ropinirole.  GERD: Lisinopril 40 mg  twice daily. High dose. No hx of GIB in Epic.     transcribed by : iNlam Loza  Orders:  1. Discontinue Neurontin 100 mg TID   2. Start Neurontin 150 mg BID  3. BMP - dx: CKD/CHF  4. Coumadin 1 mg tonight and tomorrow then recheck INR on Friday      Total time spent with patient visit at the skilled nursing facility was 46 minutes including patient visit, review of past records and completing documentation including A/P. Greater than 50% of total time spent with counseling, discussing plan of stay, discussing above A/P,  and coordinating care with nursing staff due to above ongoing medical conditions.        Electronically signed by:  Cece Blandon MD                       Sincerely,        Cece Blandon MD

## 2020-02-13 ENCOUNTER — HOSPITAL LABORATORY (OUTPATIENT)
Facility: OTHER | Age: 70
End: 2020-02-13

## 2020-02-13 VITALS
DIASTOLIC BLOOD PRESSURE: 48 MMHG | SYSTOLIC BLOOD PRESSURE: 129 MMHG | RESPIRATION RATE: 18 BRPM | OXYGEN SATURATION: 93 % | HEART RATE: 72 BPM | TEMPERATURE: 97.5 F | WEIGHT: 315 LBS

## 2020-02-13 LAB
ANION GAP SERPL CALCULATED.3IONS-SCNC: 2 MMOL/L (ref 3–14)
BUN SERPL-MCNC: 47 MG/DL (ref 7–30)
CALCIUM SERPL-MCNC: 8.5 MG/DL (ref 8.5–10.1)
CHLORIDE SERPL-SCNC: 98 MMOL/L (ref 94–109)
CO2 SERPL-SCNC: 37 MMOL/L (ref 20–32)
CREAT SERPL-MCNC: 1.79 MG/DL (ref 0.66–1.25)
GFR SERPL CREATININE-BSD FRML MDRD: 37 ML/MIN/{1.73_M2}
GLUCOSE SERPL-MCNC: 130 MG/DL (ref 70–99)
POTASSIUM SERPL-SCNC: 3.5 MMOL/L (ref 3.4–5.3)
SODIUM SERPL-SCNC: 137 MMOL/L (ref 133–144)

## 2020-02-13 NOTE — TELEPHONE ENCOUNTER
[facility] TELEPHONE ENCOUNTER    Chief Complaint   Patient presents with     Hyperglycemia       Valdo Oneill Jr. is a 69 year old  (1950),Nurse called today to report: blood sugar of 503 this evening. Not symptomatic with elevated sugar. Was drinking coke and resistant to give up.    ASSESSMENT/PLAN      Additional 5 units of Insulin lispro was ordered to give with recheck 1 hour later    Encourage oral intake of water, no sugary drinks    Recheck blood sugar at 2230 of 352. Additional 5 units of insulin lispro ordered to give and monitor for s/s of hyper/hypoglycemia     Recheck blood sugar at midnight and 0400.  Contact provider if >400 or <70      RUY Garcia CNP

## 2020-02-14 ENCOUNTER — NURSING HOME VISIT (OUTPATIENT)
Dept: GERIATRICS | Facility: CLINIC | Age: 70
End: 2020-02-14
Payer: COMMERCIAL

## 2020-02-14 DIAGNOSIS — Z79.2 LONG TERM (CURRENT) USE OF ANTIBIOTICS: ICD-10-CM

## 2020-02-14 DIAGNOSIS — I48.20 CHRONIC ATRIAL FIBRILLATION (H): Primary | ICD-10-CM

## 2020-02-14 PROCEDURE — 99308 SBSQ NF CARE LOW MDM 20: CPT | Performed by: NURSE PRACTITIONER

## 2020-02-14 NOTE — PROGRESS NOTES
[place] [facility]  [place] Medical Record Number:  [id number]  Place of Service where encounter took place:  THE [facility] AT [facility] ([facility]) [[id number]]  Chief Complaint   Patient presents with     RECHECK       HPI:    Valdo Oneill Jr.  is a 69 year old (1950), who is being seen today for an episodic care visit.  HPI information obtained from: facility chart records, facility staff, patient report and [facility] chart review.    Patient with recent angiogram and stent to the RCA.  Admitted to TCU for strengthening.  Patient with reports of chest pain on 2/16.  Patient was sent to the ER for evaluation and was admitted to [facility].  His EKG and troponins were both initially negative.  Cardiology felt an angiogram was too high risk due to recent angiogram in the setting of CKD with recent dye load.  Symptoms felt to be atypical and cardiology recommended observation.  No medications were changed.    Patient returned to the TCU and is participating in therapy.  Patient notes no chest pain since return.  Patient states he plans to see his primary care provider in 1 day.    No other nursing or patient concerns today    Past Medical and Surgical History reviewed in Epic today.    MEDICATIONS:  Current Outpatient Medications   Medication Sig Dispense Refill     Acetaminophen (TYLENOL PO) Take 1,000 mg by mouth every 6 hours as needed        allopurinol (ZYLOPRIM) 100 MG tablet Take 300 mg by mouth daily       atorvastatin (LIPITOR) 80 MG tablet Take 80 mg by mouth daily       carvedilol (COREG) 12.5 MG tablet Take 25 mg by mouth 2 times daily (with meals)       cholecalciferol (VITAMIN D3) 5000 UNITS CAPS capsule Take by mouth daily       clopidogrel (PLAVIX) 75 MG tablet Take 75 mg by mouth daily        diclofenac (VOLTAREN) 1 % topical gel Place 2 g onto the skin 4 times daily       DULoxetine (CYMBALTA) 60 MG capsule Take 60 mg by mouth 2 times daily       esomeprazole  "(NEXIUM) 40 MG DR capsule Take 40 mg by mouth 2 times daily Take 30-60 minutes before eating.       ezetimibe (ZETIA) 10 MG tablet Take 10 mg by mouth every morning       gabapentin (NEURONTIN) 100 MG capsule Take 100 mg by mouth 3 times daily        hydrALAZINE (APRESOLINE) 10 MG tablet Take 20 mg by mouth 3 times daily       hypromellose (ARTIFICIAL TEARS) 0.5 % SOLN ophthalmic solution 1 drop every 2 hours as needed for dry eyes       insulin lispro (ADMELOG SOLOSTAR) 100 UNIT/ML pen Inject Subcutaneous 3 times daily (before meals) Inject as per sliding scale: if 70 - 149 = 0 <70 see hypoglycemia protocol; 150 - 199 = 3; 200 - 249 = 6; 250 - 299 = 9; 300 - 349 = 12; 350 - 399 = 15; 400 - 999 = 18 400 or > give 18 units and call MD, subcutaneously before meals       insulin lispro prot & lispro (HUMALOG MIX 75/25 KWIKPEN) (75-25) 100 UNIT/ML pen Inject 65 Units Subcutaneous every evening       insulin lispro prot & lispro (HUMALOG MIX 75/25 KWIKPEN) (75-25) 100 UNIT/ML pen Inject 55 Units Subcutaneous every evening       insulin lispro prot & lispro (HUMALOG MIX 75/25 KWIKPEN) (75-25) 100 UNIT/ML pen Inject 70 Units Subcutaneous every morning       insulin lispro prot & lispro (HUMALOG MIX 75/25 KWIKPEN) (75-25) 100 UNIT/ML pen Inject 90 Units Subcutaneous every morning       insulin syringe-needle U-100 (ULTICARE INSULIN SYRINGE) 30G X 1/2\" 0.5 ML Use 3 syringes daily or as directed.       isosorbide mononitrate CR (IMDUR) 120 MG 24 HR ER tablet Take 120 mg by mouth every morning       Lidocaine (LIDOCARE) 4 % Patch Apply 1 patch transdermally every day shift for pain       mometasone (ELOCON) 0.1 % external cream Apply topically daily Apply to external ears topically as needed       nitroglycerin (NITROSTAT) 0.4 MG SL tablet Place under the tongue every 5 minutes as needed       ORDER FOR DME Oxygen-2 liters per minute at bedtime for BIPAP       ORDER FOR DME BiPap-as directed       oxyCODONE HCl (OXAYDO) 5 MG " "TABA Give 1.5 tablet by mouth every 4 hours as needed for chronic midline low back pain with bilateral sciatica       Polyethylene Glycol 3350 (MIRALAX PO) Take 17 g by mouth 2 times daily as needed        rOPINIRole (REQUIP) 0.25 MG tablet Take 0.25 mg by mouth At Bedtime       sennosides (SENOKOT) 8.6 MG tablet Take 2 tablets by mouth 2 times daily       torsemide (DEMADEX) 20 MG tablet Give 4 tablet by mouth every 12 hours       vitamin B-12 (CYANOCOBALAMIN) 500 MCG tablet Take 500 mcg by mouth every morning       Warfarin Sodium (COUMADIN PO) Take 1 mg by mouth At Bedtime Recheck INR 2/14/20         REVIEW OF SYSTEMS:  4 point ROS including Respiratory, CV, GI and , other than that noted in the HPI,  is negative    Objective:  /63   Pulse 90   Temp 97.8  F (36.6  C)   Resp 18   Ht 1.753 m (5' 9.02\")   Wt (!) 165.9 kg (365 lb 12.8 oz)   SpO2 94%   BMI 53.99 kg/m    Exam:  GENERAL APPEARANCE:  Alert, in no distress, morbidly obese  RESP:  no respiratory distress, diminished breath sounds Throughout, using supplemental oxygen  CV:  irregular rhythm , reg rate, distant heart tones, generalized edema  ABDOMEN:  Firm, obese, nontender, no guarding or rebound, bowel sounds normal  SKIN:  Right lower shin mildly erythemic, no open areas  PSYCH:  oriented X 3, affect and mood normal    Labs:   Recent labs in [facility] reviewed by me today.  and     Most Recent 3 BMP's:  Recent Labs   Lab Test 02/13/20  0735 01/25/19  1238    135   POTASSIUM 3.5 4.4   CHLORIDE 98 96   CO2 37* 36*   BUN 47* 55*   CR 1.79* 1.99*   ANIONGAP 2* 3   DAVID 8.5 8.4*   * 309*       ASSESSMENT/PLAN:  Acute on chronic diastolic congestive heart failure (H)  - wt up 5 lbs since admission.  Continue torsemide.  Monitor weights closely    - Vitals reviewed: sbp 120-150, HR 70-90.  Continue carvedilol, hydralazine and isosorbide    NSTEMI (non-ST elevated myocardial infarction) (H)  Coronary artery disease of native artery of " native heart with stable angina pectoris (H)  Chronic atrial fibrillation  -Recent admission for chest pain.  Work-up essentially negative.  Cardiology recommended no interventions.  Is due to follow-up with cardiology in 1 week  -Continue above cardiac medications, Plavix, Coumadin and statin.   -Patient counseled to update nursing if chest pain returns    CKD (chronic kidney disease) stage 4, GFR 15-29 ml/min (H)  -GFR 42, creatinine 1.91 during recent hospitalization.  Has chronic kidney disease and received dye with recent angiogram  -Monitor kidney function closely and avoid nephrotoxic medications    Type 2 diabetes mellitus with other circulatory complication, with long-term current use of insulin (H)  -Patient on high doses of insulin, blood sugars elevated.   -Last A1c 7.5% in 12/19/2019  - blood sugars in TCU:      Morbid obesity (H)  -Body mass index is 53.99 kg/m .  -Mobility likely contributing, dietitian to follow      transcribed by : Bravo Avila  1. CBC. CMP. Dx: CAD      Electronically signed by:  RUY Jacobo CNP

## 2020-02-14 NOTE — PROGRESS NOTES
[place] [facility]  [place] Medical Record Number:  [id number]  Place of Service where encounter took place: [facility] [place] AT [facility] ([facility]) [[id number]]    HPI:    Valdo Oneill Jr. is a 69 year old  (1950), who is being seen today for an episodic care visit at the above location.   HPI information obtained from: facility chart records, facility staff, patient report and [facility] chart review. Today's concern is INR/Coumadin management for A. Fib    ROS/Subjective:  Bleeding Signs/Symptoms:  None  Thromboembolic Signs/Symptoms:  None  Medication Changes:  No  Dietary Changes:  No  Activity Changes: No  Bacterial/Viral Infection:  No  Missed Coumadin Doses:  None  On ASA: Yes - 81 mg po q day  Other Concerns:  No    OBJECTIVE:  /48   Pulse 72   Temp 97.5  F (36.4  C)   Resp 18   Wt (!) 163.3 kg (360 lb)   SpO2 93%   Last INR: 3.1 on 2/12  INR Today:  3.2  Current Dose:  1 mg  INR Flow sheet at SNF:    ASSESSMENT:     Chronic atrial fibrillation  Long term (current) use of antibiotics  Supratherapeutic INR for goal of 2-3    PLAN:   Orders written by provider at facility  New Dose: hold x 1 day, then 1 mg QD    Next INR: 3 days      Electronically signed by:  RUY Jacobo CNP

## 2020-02-16 ENCOUNTER — TELEPHONE (OUTPATIENT)
Dept: GERIATRICS | Facility: CLINIC | Age: 70
End: 2020-02-16

## 2020-02-16 PROBLEM — N18.4 CKD (CHRONIC KIDNEY DISEASE) STAGE 4, GFR 15-29 ML/MIN (H): Status: ACTIVE | Noted: 2020-02-16

## 2020-02-16 PROBLEM — I50.33 ACUTE ON CHRONIC DIASTOLIC CONGESTIVE HEART FAILURE (H): Status: ACTIVE | Noted: 2020-02-16

## 2020-02-16 ASSESSMENT — MIFFLIN-ST. JEOR: SCORE: 2409.89

## 2020-02-16 NOTE — TELEPHONE ENCOUNTER
[place] [facility] TELEPHONE ENCOUNTER    Chief Complaint   Patient presents with     Hyperglycemia     before supper        Valdo Oneill Jr. is a 69 year old  (1950),Nurse called today to report: above elevated BGT.  Reading before lunch was 318 and received insulin Lispro 12 units.  Now before supper has received Humalog 75/25 65 Units + 55 Units    ASSESSMENT/PLAN  Hyperglycemia - severe insulin resistance    Encourage fluids    Recheck BGT at 7 and call back if BGT still high    RUY Acevedo CNP

## 2020-02-19 ENCOUNTER — NURSING HOME VISIT (OUTPATIENT)
Dept: GERIATRICS | Facility: CLINIC | Age: 70
End: 2020-02-19
Payer: COMMERCIAL

## 2020-02-19 VITALS
SYSTOLIC BLOOD PRESSURE: 123 MMHG | HEIGHT: 69 IN | DIASTOLIC BLOOD PRESSURE: 63 MMHG | BODY MASS INDEX: 46.65 KG/M2 | HEART RATE: 90 BPM | WEIGHT: 315 LBS | OXYGEN SATURATION: 94 % | TEMPERATURE: 97.8 F | RESPIRATION RATE: 18 BRPM

## 2020-02-19 DIAGNOSIS — I21.4 NSTEMI (NON-ST ELEVATED MYOCARDIAL INFARCTION) (H): ICD-10-CM

## 2020-02-19 DIAGNOSIS — Z79.4 TYPE 2 DIABETES MELLITUS WITH OTHER CIRCULATORY COMPLICATION, WITH LONG-TERM CURRENT USE OF INSULIN (H): ICD-10-CM

## 2020-02-19 DIAGNOSIS — N18.4 CKD (CHRONIC KIDNEY DISEASE) STAGE 4, GFR 15-29 ML/MIN (H): ICD-10-CM

## 2020-02-19 DIAGNOSIS — I50.33 ACUTE ON CHRONIC DIASTOLIC CONGESTIVE HEART FAILURE (H): Primary | ICD-10-CM

## 2020-02-19 DIAGNOSIS — I25.118 CORONARY ARTERY DISEASE OF NATIVE ARTERY OF NATIVE HEART WITH STABLE ANGINA PECTORIS (H): ICD-10-CM

## 2020-02-19 DIAGNOSIS — E66.01 MORBID OBESITY (H): ICD-10-CM

## 2020-02-19 DIAGNOSIS — E11.59 TYPE 2 DIABETES MELLITUS WITH OTHER CIRCULATORY COMPLICATION, WITH LONG-TERM CURRENT USE OF INSULIN (H): ICD-10-CM

## 2020-02-19 DIAGNOSIS — I48.20 CHRONIC ATRIAL FIBRILLATION (H): ICD-10-CM

## 2020-02-19 PROCEDURE — 99309 SBSQ NF CARE MODERATE MDM 30: CPT | Performed by: NURSE PRACTITIONER

## 2020-02-19 NOTE — LETTER
2/19/2020        RE: Valdo Oneill Jr.  [address] [id number]        [place] [facility]  [place] Medical Record Number:  [id number]  Place of Service where encounter took place:  [facility] ([facility]) [[id number]]  Chief Complaint   Patient presents with     RECHECK       HPI:    Valdo Oneill Jr.  is a 69 year old (1950), who is being seen today for an episodic care visit.  HPI information obtained from: facility chart records, facility staff, patient report and [facility] chart review.    Patient with recent angiogram and stent to the RCA.  Admitted to TCU for strengthening.  Patient with reports of chest pain on 2/16.  Patient was sent to the ER for evaluation and was admitted to [facility].  His EKG and troponins were both initially negative.  Cardiology felt an angiogram was too high risk due to recent angiogram in the setting of CKD with recent dye load.  Symptoms felt to be atypical and cardiology recommended observation.  No medications were changed.    Patient returned to the TCU and is participating in therapy.  Patient notes no chest pain since return.  Patient states he plans to see his primary care provider in 1 day.    No other nursing or patient concerns today    Past Medical and Surgical History reviewed in Epic today.    MEDICATIONS:  Current Outpatient Medications   Medication Sig Dispense Refill     Acetaminophen (TYLENOL PO) Take 1,000 mg by mouth every 6 hours as needed        allopurinol (ZYLOPRIM) 100 MG tablet Take 300 mg by mouth daily       atorvastatin (LIPITOR) 80 MG tablet Take 80 mg by mouth daily       carvedilol (COREG) 12.5 MG tablet Take 25 mg by mouth 2 times daily (with meals)       cholecalciferol (VITAMIN D3) 5000 UNITS CAPS capsule Take by mouth daily       clopidogrel (PLAVIX) 75 MG tablet Take 75 mg by mouth daily        diclofenac (VOLTAREN) 1 % topical gel Place 2 g onto the skin 4 times daily        "DULoxetine (CYMBALTA) 60 MG capsule Take 60 mg by mouth 2 times daily       esomeprazole (NEXIUM) 40 MG DR capsule Take 40 mg by mouth 2 times daily Take 30-60 minutes before eating.       ezetimibe (ZETIA) 10 MG tablet Take 10 mg by mouth every morning       gabapentin (NEURONTIN) 100 MG capsule Take 100 mg by mouth 3 times daily        hydrALAZINE (APRESOLINE) 10 MG tablet Take 20 mg by mouth 3 times daily       hypromellose (ARTIFICIAL TEARS) 0.5 % SOLN ophthalmic solution 1 drop every 2 hours as needed for dry eyes       insulin lispro (ADMELOG SOLOSTAR) 100 UNIT/ML pen Inject Subcutaneous 3 times daily (before meals) Inject as per sliding scale: if 70 - 149 = 0 <70 see hypoglycemia protocol; 150 - 199 = 3; 200 - 249 = 6; 250 - 299 = 9; 300 - 349 = 12; 350 - 399 = 15; 400 - 999 = 18 400 or > give 18 units and call MD, subcutaneously before meals       insulin lispro prot & lispro (HUMALOG MIX 75/25 KWIKPEN) (75-25) 100 UNIT/ML pen Inject 65 Units Subcutaneous every evening       insulin lispro prot & lispro (HUMALOG MIX 75/25 KWIKPEN) (75-25) 100 UNIT/ML pen Inject 55 Units Subcutaneous every evening       insulin lispro prot & lispro (HUMALOG MIX 75/25 KWIKPEN) (75-25) 100 UNIT/ML pen Inject 70 Units Subcutaneous every morning       insulin lispro prot & lispro (HUMALOG MIX 75/25 KWIKPEN) (75-25) 100 UNIT/ML pen Inject 90 Units Subcutaneous every morning       insulin syringe-needle U-100 (ULTICARE INSULIN SYRINGE) 30G X 1/2\" 0.5 ML Use 3 syringes daily or as directed.       isosorbide mononitrate CR (IMDUR) 120 MG 24 HR ER tablet Take 120 mg by mouth every morning       Lidocaine (LIDOCARE) 4 % Patch Apply 1 patch transdermally every day shift for pain       mometasone (ELOCON) 0.1 % external cream Apply topically daily Apply to external ears topically as needed       nitroglycerin (NITROSTAT) 0.4 MG SL tablet Place under the tongue every 5 minutes as needed       ORDER FOR DME Oxygen-2 liters per minute at " "bedtime for BIPAP       ORDER FOR DME BiPap-as directed       oxyCODONE HCl (OXAYDO) 5 MG TABA Give 1.5 tablet by mouth every 4 hours as needed for chronic midline low back pain with bilateral sciatica       Polyethylene Glycol 3350 (MIRALAX PO) Take 17 g by mouth 2 times daily as needed        rOPINIRole (REQUIP) 0.25 MG tablet Take 0.25 mg by mouth At Bedtime       sennosides (SENOKOT) 8.6 MG tablet Take 2 tablets by mouth 2 times daily       torsemide (DEMADEX) 20 MG tablet Give 4 tablet by mouth every 12 hours       vitamin B-12 (CYANOCOBALAMIN) 500 MCG tablet Take 500 mcg by mouth every morning       Warfarin Sodium (COUMADIN PO) Take 1 mg by mouth At Bedtime Recheck INR 2/14/20         REVIEW OF SYSTEMS:  4 point ROS including Respiratory, CV, GI and , other than that noted in the HPI,  is negative    Objective:  /63   Pulse 90   Temp 97.8  F (36.6  C)   Resp 18   Ht 1.753 m (5' 9.02\")   Wt (!) 165.9 kg (365 lb 12.8 oz)   SpO2 94%   BMI 53.99 kg/m     Exam:  GENERAL APPEARANCE:  Alert, in no distress, morbidly obese  RESP:  no respiratory distress, diminished breath sounds Throughout, using supplemental oxygen  CV:  irregular rhythm , reg rate, distant heart tones, generalized edema  ABDOMEN:  Firm, obese, nontender, no guarding or rebound, bowel sounds normal  SKIN:  Right lower shin mildly erythemic, no open areas  PSYCH:  oriented X 3, affect and mood normal    Labs:   Recent labs in [facility] reviewed by me today.  and     Most Recent 3 BMP's:  Recent Labs   Lab Test 02/13/20  0735 01/25/19  1238    135   POTASSIUM 3.5 4.4   CHLORIDE 98 96   CO2 37* 36*   BUN 47* 55*   CR 1.79* 1.99*   ANIONGAP 2* 3   DAVID 8.5 8.4*   * 309*       ASSESSMENT/PLAN:  Acute on chronic diastolic congestive heart failure (H)  - wt up 5 lbs since admission.  Continue torsemide.  Monitor weights closely    - Vitals reviewed: sbp 120-150, HR 70-90.  Continue carvedilol, hydralazine and isosorbide    NSTEMI " (non-ST elevated myocardial infarction) (H)  Coronary artery disease of native artery of native heart with stable angina pectoris (H)  Chronic atrial fibrillation  -Recent admission for chest pain.  Work-up essentially negative.  Cardiology recommended no interventions.  Is due to follow-up with cardiology in 1 week  -Continue above cardiac medications, Plavix, Coumadin and statin.   -Patient counseled to update nursing if chest pain returns    CKD (chronic kidney disease) stage 4, GFR 15-29 ml/min (H)  -GFR 42, creatinine 1.91 during recent hospitalization.  Has chronic kidney disease and received dye with recent angiogram  -Monitor kidney function closely and avoid nephrotoxic medications    Type 2 diabetes mellitus with other circulatory complication, with long-term current use of insulin (H)  -Patient on high doses of insulin, blood sugars elevated.   -Last A1c 7.5% in 12/19/2019  - blood sugars in TCU:      Morbid obesity (H)  -Body mass index is 53.99 kg/m .  -Mobility likely contributing, dietitian to follow      transcribed by : Bravo Avila  1. CBC. CMP. Dx: CAD      Electronically signed by:  RUY Jacobo CNP               Sincerely,        RUY Jacobo CNP

## 2020-02-20 ENCOUNTER — NURSING HOME VISIT (OUTPATIENT)
Dept: GERIATRICS | Facility: CLINIC | Age: 70
End: 2020-02-20
Payer: COMMERCIAL

## 2020-02-20 ENCOUNTER — HOSPITAL LABORATORY (OUTPATIENT)
Facility: OTHER | Age: 70
End: 2020-02-20

## 2020-02-20 VITALS
DIASTOLIC BLOOD PRESSURE: 69 MMHG | RESPIRATION RATE: 20 BRPM | BODY MASS INDEX: 53.55 KG/M2 | SYSTOLIC BLOOD PRESSURE: 127 MMHG | TEMPERATURE: 97.5 F | OXYGEN SATURATION: 90 % | HEART RATE: 78 BPM | WEIGHT: 315 LBS

## 2020-02-20 DIAGNOSIS — F11.90 CHRONIC, CONTINUOUS USE OF OPIOIDS: Primary | ICD-10-CM

## 2020-02-20 DIAGNOSIS — G89.29 CHRONIC BILATERAL LOW BACK PAIN WITHOUT SCIATICA: ICD-10-CM

## 2020-02-20 DIAGNOSIS — I48.20 CHRONIC ATRIAL FIBRILLATION (H): ICD-10-CM

## 2020-02-20 DIAGNOSIS — M54.50 CHRONIC BILATERAL LOW BACK PAIN WITHOUT SCIATICA: ICD-10-CM

## 2020-02-20 LAB
ALBUMIN SERPL-MCNC: 2.8 G/DL (ref 3.4–5)
ALP SERPL-CCNC: 101 U/L (ref 40–150)
ALT SERPL W P-5'-P-CCNC: 27 U/L (ref 0–70)
ANION GAP SERPL CALCULATED.3IONS-SCNC: 6 MMOL/L (ref 3–14)
AST SERPL W P-5'-P-CCNC: 16 U/L (ref 0–45)
BILIRUB SERPL-MCNC: 0.5 MG/DL (ref 0.2–1.3)
BUN SERPL-MCNC: 48 MG/DL (ref 7–30)
CALCIUM SERPL-MCNC: 8.5 MG/DL (ref 8.5–10.1)
CHLORIDE SERPL-SCNC: 99 MMOL/L (ref 94–109)
CO2 SERPL-SCNC: 34 MMOL/L (ref 20–32)
CREAT SERPL-MCNC: 1.89 MG/DL (ref 0.66–1.25)
ERYTHROCYTE [DISTWIDTH] IN BLOOD BY AUTOMATED COUNT: 21.1 % (ref 10–15)
GFR SERPL CREATININE-BSD FRML MDRD: 35 ML/MIN/{1.73_M2}
GLUCOSE SERPL-MCNC: 155 MG/DL (ref 70–99)
HCT VFR BLD AUTO: 35.9 % (ref 40–53)
HGB BLD-MCNC: 10.6 G/DL (ref 13.3–17.7)
MCH RBC QN AUTO: 26.2 PG (ref 26.5–33)
MCHC RBC AUTO-ENTMCNC: 29.5 G/DL (ref 31.5–36.5)
MCV RBC AUTO: 89 FL (ref 78–100)
PLATELET # BLD AUTO: 146 10E9/L (ref 150–450)
POTASSIUM SERPL-SCNC: 3.4 MMOL/L (ref 3.4–5.3)
PROT SERPL-MCNC: 6.2 G/DL (ref 6.8–8.8)
RBC # BLD AUTO: 4.04 10E12/L (ref 4.4–5.9)
SODIUM SERPL-SCNC: 139 MMOL/L (ref 133–144)
WBC # BLD AUTO: 7.7 10E9/L (ref 4–11)

## 2020-02-20 PROCEDURE — 99308 SBSQ NF CARE LOW MDM 20: CPT | Performed by: NURSE PRACTITIONER

## 2020-02-20 RX ORDER — OXYCODONE HCL 10 MG/1
10 TABLET, FILM COATED, EXTENDED RELEASE ORAL EVERY 12 HOURS
Qty: 40 TABLET | Refills: 0 | Status: SHIPPED | OUTPATIENT
Start: 2020-02-20 | End: 2020-03-11

## 2020-02-20 RX ORDER — OXYCODONE HCL 10 MG/1
10 TABLET, FILM COATED, EXTENDED RELEASE ORAL EVERY 12 HOURS
COMMUNITY
End: 2020-02-20

## 2020-02-20 NOTE — PROGRESS NOTES
[place] [facility]  [place] Medical Record Number:  [id number]  Place of Service where encounter took place:  THE [facility] AT [facility] ([facility]) [[id number]]  Chief Complaint   Patient presents with     RECHECK       HPI:    Valdo Oneill Jr.  is a 69 year old (1950), who is being seen today for an episodic care visit.  HPI information obtained from: facility chart records, facility staff, patient report and [facility] chart review.     Seeing patient today for an INR follow up. No new meds. No bleeding concerns.     Patient also needs a refill of oxycodone.     Past Medical and Surgical History reviewed in Epic today.    MEDICATIONS:    Current Outpatient Medications   Medication Sig Dispense Refill     Acetaminophen (TYLENOL PO) Take 1,000 mg by mouth every 6 hours as needed        allopurinol (ZYLOPRIM) 100 MG tablet Take 300 mg by mouth daily       atorvastatin (LIPITOR) 80 MG tablet Take 80 mg by mouth daily       carvedilol (COREG) 12.5 MG tablet Take 25 mg by mouth 2 times daily (with meals)       cholecalciferol (VITAMIN D3) 5000 UNITS CAPS capsule Take by mouth daily       clopidogrel (PLAVIX) 75 MG tablet Take 75 mg by mouth daily        diclofenac (VOLTAREN) 1 % topical gel Place 2 g onto the skin 4 times daily       DULoxetine (CYMBALTA) 60 MG capsule Take 60 mg by mouth 2 times daily       ezetimibe (ZETIA) 10 MG tablet Take 10 mg by mouth every morning       gabapentin (NEURONTIN) 100 MG capsule Take 100 mg by mouth 3 times daily        hydrALAZINE (APRESOLINE) 10 MG tablet Take 20 mg by mouth 3 times daily       hypromellose (ARTIFICIAL TEARS) 0.5 % SOLN ophthalmic solution 1 drop every 2 hours as needed for dry eyes       insulin lispro (ADMELOG SOLOSTAR) 100 UNIT/ML pen Inject Subcutaneous 3 times daily (before meals) Inject as per sliding scale: if 70 - 149 = 0 <70 see hypoglycemia protocol; 150 - 199 = 3; 200 - 249 = 6; 250 - 299 = 9; 300 - 349 = 12; 350 - 399 =  "15; 400 - 999 = 18 400 or > give 18 units and call MD, subcutaneously before meals       insulin lispro prot & lispro (HUMALOG MIX 75/25 KWIKPEN) (75-25) 100 UNIT/ML pen Inject 65 Units Subcutaneous every evening       insulin lispro prot & lispro (HUMALOG MIX 75/25 KWIKPEN) (75-25) 100 UNIT/ML pen Inject 55 Units Subcutaneous every evening       insulin lispro prot & lispro (HUMALOG MIX 75/25 KWIKPEN) (75-25) 100 UNIT/ML pen Inject 160 Units Subcutaneous every morning        insulin syringe-needle U-100 (ULTICARE INSULIN SYRINGE) 30G X 1/2\" 0.5 ML Use 3 syringes daily or as directed.       isosorbide mononitrate CR (IMDUR) 120 MG 24 HR ER tablet Take 120 mg by mouth every morning       Lidocaine (LIDOCARE) 4 % Patch Apply to PATCH TO PAINFUL AREA topically two times a day related to SCIATICA, UNSPECIFIED SIDE (M54.30);PAIN IN THORACIC SPINE (M54.6) NEED TO LIST ON AND OFF 12 HOURS APART!!!!!!!       mometasone (ELOCON) 0.1 % external cream Apply topically daily Apply to external ears topically as needed       nitroglycerin (NITROSTAT) 0.4 MG SL tablet Place under the tongue every 5 minutes as needed       OXYCODONE 10 MG PO 12 hr tablet Take 1 tablet (10 mg) by mouth every 12 hours for 20 days 40 tablet 0     OXYCODONE HCL 5 MG PO TABA Take 7.5 mg by mouth every 4 hours as needed (severe  pain) Give 1.5 tablet by mouth every 4 hours as needed for PAIN 7. MG Q4H PRN -- NOT OXY IR IN TABLET FORM 30 each 0     Polyethylene Glycol 3350 (MIRALAX PO) Take 17 g by mouth 2 times daily as needed        rOPINIRole (REQUIP) 0.25 MG tablet Take 0.25 mg by mouth At Bedtime       sennosides (SENOKOT) 8.6 MG tablet Take 2 tablets by mouth 2 times daily       torsemide (DEMADEX) 20 MG tablet Give 4 tablet by mouth every 12 hours       vitamin B-12 (CYANOCOBALAMIN) 500 MCG tablet Take 500 mcg by mouth every morning       Warfarin Sodium (COUMADIN PO) Take 1 mg by mouth At Bedtime Recheck INR 2/14/20       ORDER FOR DME Oxygen-2 " liters per minute at bedtime for BIPAP       ORDER FOR DME BiPap-as directed         REVIEW OF SYSTEMS:  4 point ROS including Respiratory, CV, GI and , other than that noted in the HPI,  is negative    Objective:  /69   Pulse 78   Temp 97.5  F (36.4  C)   Resp 20   Wt (!) 164.6 kg (362 lb 12.8 oz)   SpO2 90%   BMI 53.55 kg/m    Exam:  GENERAL APPEARANCE:  Alert, in no distress    Labs:   Labs done in SNF are in [place] EPIC. Please refer to them using [employer] Everywhere.    ASSESSMENT/PLAN:  Chronic, continuous use of opioids  Chronic bilateral low back pain without sciatica  -Oxycodone 10 mg ER BID. Refilled today. , prescription sent to [facility]    Chronic atrial fibrillation  -  INR 1.8, Coumadin 1 mg every day. Recheck INR 2/27/20        Electronically signed by:  RUY Jacobo CNP

## 2020-02-26 ENCOUNTER — DISCHARGE SUMMARY NURSING HOME (OUTPATIENT)
Dept: GERIATRICS | Facility: CLINIC | Age: 70
End: 2020-02-26
Payer: COMMERCIAL

## 2020-02-26 VITALS
OXYGEN SATURATION: 90 % | WEIGHT: 315 LBS | RESPIRATION RATE: 16 BRPM | TEMPERATURE: 98 F | DIASTOLIC BLOOD PRESSURE: 72 MMHG | HEART RATE: 83 BPM | BODY MASS INDEX: 54.32 KG/M2 | SYSTOLIC BLOOD PRESSURE: 149 MMHG

## 2020-02-26 DIAGNOSIS — I48.20 CHRONIC ATRIAL FIBRILLATION (H): ICD-10-CM

## 2020-02-26 DIAGNOSIS — G25.81 RESTLESS LEGS SYNDROME (RLS): ICD-10-CM

## 2020-02-26 DIAGNOSIS — F33.41 RECURRENT MAJOR DEPRESSIVE DISORDER, IN PARTIAL REMISSION (H): ICD-10-CM

## 2020-02-26 DIAGNOSIS — N18.4 CKD (CHRONIC KIDNEY DISEASE) STAGE 4, GFR 15-29 ML/MIN (H): Primary | ICD-10-CM

## 2020-02-26 DIAGNOSIS — E66.01 MORBID OBESITY (H): ICD-10-CM

## 2020-02-26 DIAGNOSIS — G89.29 CHRONIC BILATERAL LOW BACK PAIN WITHOUT SCIATICA: ICD-10-CM

## 2020-02-26 DIAGNOSIS — I48.0 PAROXYSMAL ATRIAL FIBRILLATION (H): ICD-10-CM

## 2020-02-26 DIAGNOSIS — M54.50 CHRONIC BILATERAL LOW BACK PAIN WITHOUT SCIATICA: ICD-10-CM

## 2020-02-26 DIAGNOSIS — F11.90 CHRONIC, CONTINUOUS USE OF OPIOIDS: ICD-10-CM

## 2020-02-26 DIAGNOSIS — J96.12 CHRONIC RESPIRATORY FAILURE WITH HYPERCAPNIA (H): ICD-10-CM

## 2020-02-26 DIAGNOSIS — I21.4 NSTEMI (NON-ST ELEVATED MYOCARDIAL INFARCTION) (H): ICD-10-CM

## 2020-02-26 DIAGNOSIS — G47.33 OSA (OBSTRUCTIVE SLEEP APNEA): ICD-10-CM

## 2020-02-26 DIAGNOSIS — E11.59 TYPE 2 DIABETES MELLITUS WITH OTHER CIRCULATORY COMPLICATION, WITH LONG-TERM CURRENT USE OF INSULIN (H): ICD-10-CM

## 2020-02-26 DIAGNOSIS — I25.118 CORONARY ARTERY DISEASE OF NATIVE ARTERY OF NATIVE HEART WITH STABLE ANGINA PECTORIS (H): ICD-10-CM

## 2020-02-26 DIAGNOSIS — I50.33 ACUTE ON CHRONIC DIASTOLIC CONGESTIVE HEART FAILURE (H): ICD-10-CM

## 2020-02-26 DIAGNOSIS — Z79.4 TYPE 2 DIABETES MELLITUS WITH OTHER CIRCULATORY COMPLICATION, WITH LONG-TERM CURRENT USE OF INSULIN (H): ICD-10-CM

## 2020-02-26 PROCEDURE — 99316 NF DSCHRG MGMT 30 MIN+: CPT | Performed by: NURSE PRACTITIONER

## 2020-02-26 NOTE — LETTER
2/26/2020        RE: Valdo Oneill Jr.  [address] [facility]  [place] [address]        [place] [facility]ERIATRIC [facility] DISCHARGE SUMMARY  PATIENT'S NAME: Valdo Oneill Jr.  YOB: 1950  MEDICAL RECORD NUMBER:  [id number]  Place of Service where encounter took place:  THE \[facility]\"" AT [facility] [[id number]]    PRIMARY CARE PROVIDER AND CLINIC RESPONSIBLE AFTER TRANSFER:   Amaya Dawkins MD, MD, [address]   Non-FMG Provider     Transferring providers: RUY Jacobo CNP, Dr. Jamia MD  Recent Hospitalization/ED:  [facility]  stay 2/17/20 to 2/18/20.  Date of SNF Admission: February / 10 / 2020  Date of SNF (anticipated) Discharge: February / 28 / 2020  Discharged to: home  Cognitive Scores: MOCA: 25/30  Physical Function: Anton Balance Scale: 21/56      CODE STATUS/ADVANCE DIRECTIVES DISCUSSION:  Full Code   ALLERGIES: Ceftriaxone and Morphine    DISCHARGE DIAGNOSIS/NURSING FACILITY COURSE:     Brief Summary of Hospital Course:   - Pt with pMH notable for diastolic CHF hospitalized for NSTEMI and acute on chronic CHF diuresed, noted elevated trop, and NSTEMI but felt to be 2/2 demand ischemia, required coronary angiogram followed by stenting x1 to RCA. Losartan and diltiazem stopped.   - complicated with acute on chronic respiratory failure, delirium (2/2 to respiratory condition and / or opioid use), improved.   - Had proteus UTI ,treated with cipro x 5 days.   - hypoglycemia, insulin adjusted, then titrated up back.     Acute on chronic diastolic heart failure (H)  NSTEMI (H) likely demand ischemia, angiogram revealed multiple ischemic areas, s/p stenting x1 to RCA. Noted to have left circumflex lesion of which brachytherapy followed by percutaneous intervention will be considered if symptoms redevelop    ASCVD (H)   Other and unspecified hyperlipidemia  - s/p CABG 2007 and stenting to LAD + RCA  1/14/19  - Admitted to the hospital during TCU, no interventions, cards recommended observation  - compensa glendy, continue torsemide, Aldactone.   -  Losartan and diltiazem were stopped.   - on ASA (given for 7 days) , ezetimibe 10 mg, lipitor 80 mg, coreg 25 mg bid, plavix (indefinitely)  - will f/u with cardiology as OP        Paroxysmal atrial fibrillation (HC)  - Anticoagulation monitoring, INR range 2-3  - PFO (patent foramen ovale)  - Echo with grossly NL EF- see above for detail.   - on coumadin, will f/u with INR clinic after discharge        Diabetes mellitus with other circulatroy complication, with long-term current nickolas of insulin (HC)  -  A1C 7.5% 12/19/19  - hypoglycemia while hospitalized, insulin reduced, then titrated up.   - on humalog mix and SSI.        Chronic hypercapnic respiratory insufficiency (H)  Morbid obesity with BMI of 45.0-49.9, adult (HC)  MADAY treated with BiPAP  Restrictive lung disease secondary to obesity   - back to baseline. Continue to monitor.   - counseled on cutting down on carbs.         Depression, major, recurrent, moderate (HC): stable.  continue cymbalta     CKD  Stage 4, GFR 26 ml/min on Jan 23rd (HC)  - LUE AV fistula in place over left antecubital fossa- has been there for years. NOT on HD.   - Avoid nephrotoxic drugs. Renal dose the medications.   - labs stable in TCU        Hx of R BKA  (H)  Chronic opioid use  - on gabapentin 100 mg tid,  However GFR is < 50 ml/min, will change to 150 mg bid  - on OxyContin and oxycodone.  Analgesia optimal.      Gout: no flare up. On allopurinol 300 mg.     Restless legs syndrome (RLS): stable, continue ropinirole.    Past Medical History:  has no past medical history on file.    Discharge Medications:    Current Outpatient Medications   Medication Sig Dispense Refill     Acetaminophen (TYLENOL PO) Take 1,000 mg by mouth every 6 hours as needed        allopurinol (ZYLOPRIM) 100 MG tablet Take 300 mg by mouth daily        "atorvastatin (LIPITOR) 80 MG tablet Take 80 mg by mouth daily       carvedilol (COREG) 12.5 MG tablet Take 25 mg by mouth 2 times daily (with meals)       cholecalciferol (VITAMIN D3) 5000 UNITS CAPS capsule Take by mouth daily       clopidogrel (PLAVIX) 75 MG tablet Take 75 mg by mouth daily        diclofenac (VOLTAREN) 1 % topical gel Place 2 g onto the skin 4 times daily       DULoxetine (CYMBALTA) 60 MG capsule Take 60 mg by mouth 2 times daily       ezetimibe (ZETIA) 10 MG tablet Take 10 mg by mouth every morning       gabapentin (NEURONTIN) 100 MG capsule Take 100 mg by mouth 3 times daily        hydrALAZINE (APRESOLINE) 10 MG tablet Take 20 mg by mouth 3 times daily       hypromellose (ARTIFICIAL TEARS) 0.5 % SOLN ophthalmic solution 1 drop every 2 hours as needed for dry eyes       insulin lispro (ADMELOG SOLOSTAR) 100 UNIT/ML pen Inject Subcutaneous 3 times daily (before meals) Inject as per sliding scale: if 70 - 149 = 0 <70 see hypoglycemia protocol; 150 - 199 = 3; 200 - 249 = 6; 250 - 299 = 9; 300 - 349 = 12; 350 - 399 = 15; 400 - 999 = 18 400 or > give 18 units and call MD, subcutaneously before meals       insulin lispro prot & lispro (HUMALOG MIX 75/25 KWIKPEN) (75-25) 100 UNIT/ML pen Inject 65 Units Subcutaneous every evening       insulin lispro prot & lispro (HUMALOG MIX 75/25 KWIKPEN) (75-25) 100 UNIT/ML pen Inject 55 Units Subcutaneous every evening       insulin lispro prot & lispro (HUMALOG MIX 75/25 KWIKPEN) (75-25) 100 UNIT/ML pen Inject 160 Units Subcutaneous every morning        insulin syringe-needle U-100 (ULTICARE INSULIN SYRINGE) 30G X 1/2\" 0.5 ML Use 3 syringes daily or as directed.       isosorbide mononitrate CR (IMDUR) 120 MG 24 HR ER tablet Take 120 mg by mouth every morning       Lidocaine (LIDOCARE) 4 % Patch Apply to PATCH TO PAINFUL AREA topically two times a day related to SCIATICA, UNSPECIFIED SIDE (M54.30);PAIN IN THORACIC SPINE (M54.6) NEED TO LIST ON AND OFF 12 HOURS " APART!!!!!!!       mometasone (ELOCON) 0.1 % external cream Apply topically daily Apply to external ears topically as needed       nitroglycerin (NITROSTAT) 0.4 MG SL tablet Place under the tongue every 5 minutes as needed       ORDER FOR DME Oxygen-2 liters per minute at bedtime for BIPAP       ORDER FOR DME BiPap-as directed       OXYCODONE 10 MG PO 12 hr tablet Take 1 tablet (10 mg) by mouth every 12 hours for 20 days 40 tablet 0     OXYCODONE HCL 5 MG PO TABA Take 7.5 mg by mouth every 4 hours as needed (severe  pain) Give 1.5 tablet by mouth every 4 hours as needed for PAIN 7. MG Q4H PRN -- NOT OXY IR IN TABLET FORM 30 each 0     Polyethylene Glycol 3350 (MIRALAX PO) Take 17 g by mouth 2 times daily as needed        rOPINIRole (REQUIP) 0.25 MG tablet Take 0.25 mg by mouth At Bedtime       sennosides (SENOKOT) 8.6 MG tablet Take 2 tablets by mouth 2 times daily       torsemide (DEMADEX) 20 MG tablet Give 4 tablet by mouth every 12 hours       vitamin B-12 (CYANOCOBALAMIN) 500 MCG tablet Take 500 mcg by mouth every morning       Warfarin Sodium (COUMADIN PO) Take 1 mg by mouth At Bedtime Recheck INR 2/14/20         Medication Changes/Rationale:     OK to send patient with all 5 day of supply of oxycodone ER 10 mg tab.       ROS:   4 point ROS including Respiratory, CV, GI and , other than that noted in the HPI,  is negative    Physical Exam:   Vitals: BP (!) 149/72   Pulse 83   Temp 98  F (36.7  C)   Resp 16   Wt (!) 166.9 kg (368 lb)   SpO2 90%   BMI 54.32 kg/m     BMI= Body mass index is 54.32 kg/m .  GENERAL APPEARANCE:  Alert, in no distress, morbidly obese  RESP:  lungs clear to auscultation , diminished breath sounds t/o, no adventatious breath sounds  CV:  irregular rhythm , reg rate, generalized edema  ABDOMEN:  obese, semi firm, non tender, + BS's  SKIN:  Inspection of skin and subcutaneous tissue baseline  PSYCH:  oriented X 3, affect and mood normal     SNF labs:   Most Recent 3 CBC's:  Recent  Labs   Lab Test 02/20/20  0805   WBC 7.7   HGB 10.6*   MCV 89   *     Most Recent 3 BMP's:  Recent Labs   Lab Test 02/20/20  0805 02/13/20  0735 01/25/19  1238    137 135   POTASSIUM 3.4 3.5 4.4   CHLORIDE 99 98 96   CO2 34* 37* 36*   BUN 48* 47* 55*   CR 1.89* 1.79* 1.99*   ANIONGAP 6 2* 3   DAVID 8.5 8.5 8.4*   * 130* 309*     Most Recent 2 LFT's:  Recent Labs   Lab Test 02/20/20  0805   AST 16   ALT 27   ALKPHOS 101   BILITOTAL 0.5         DISCHARGE PLAN:    Follow up labs: per PCP    Medical Follow Up:      Follow up with PCP in 3-5 days    MTM referral needed and placed by this provider: No    Current [place] scheduled appointments:   none    Discharge Services: Home Care:  Occupational Therapy, Physical Therapy, Registered Nurse and From:  [facility]    Discharge Instructions Verbalized to Patient at Discharge:     None      TOTAL DISCHARGE TIME:   Greater than 30 minutes  Electronically signed by:  RUY Jacobo CNP     Home care Face to Face documentation done in [facility] attached to Home care orders for [facility].                     Sincerely,        RUY Jacobo CNP

## 2020-02-26 NOTE — PROGRESS NOTES
[place] [facility]ERIATRIC [facility] DISCHARGE SUMMARY  PATIENT'S NAME: Valdo Oneill Jr.  YOB: 1950  MEDICAL RECORD NUMBER:  [id number]  Place of Service where encounter took place:  THE [facility] (Counts include 234 beds at the [facility]) [[id number]]    PRIMARY CARE PROVIDER AND CLINIC RESPONSIBLE AFTER TRANSFER:   Amaya Dawkins MD, MD, [facility] [address] [place] [address]   Non-FMG Provider     Transferring providers: RUY Jacobo CNP, Dr. Jamia MD  Recent Hospitalization/ED:  [facility]  stay 2/17/20 to 2/18/20.  Date of SNF Admission: February / 10 / 2020  Date of SNF (anticipated) Discharge: February / 28 / 2020  Discharged to: home  Cognitive Scores: MOCA: 25/30  Physical Function: Anton Balance Scale: 21/56      CODE STATUS/ADVANCE DIRECTIVES DISCUSSION:  Full Code   ALLERGIES: Ceftriaxone and Morphine    DISCHARGE DIAGNOSIS/NURSING FACILITY COURSE:     Brief Summary of Hospital Course:   - Pt with pMH notable for diastolic CHF hospitalized for NSTEMI and acute on chronic CHF diuresed, noted elevated trop, and NSTEMI but felt to be 2/2 demand ischemia, required coronary angiogram followed by stenting x1 to RCA. Losartan and diltiazem stopped.   - complicated with acute on chronic respiratory failure, delirium (2/2 to respiratory condition and / or opioid use), improved.   - Had proteus UTI ,treated with cipro x 5 days.   - hypoglycemia, insulin adjusted, then titrated up back.     Acute on chronic diastolic heart failure (H)  NSTEMI (H) likely demand ischemia, angiogram revealed multiple ischemic areas, s/p stenting x1 to RCA. Noted to have left circumflex lesion of which brachytherapy followed by percutaneous intervention will be considered if symptoms redevelop    ASCVD (H)   Other and unspecified hyperlipidemia  - s/p CABG 2007 and stenting to LAD + RCA 1/14/19  - Admitted to the hospital during TCU, no interventions, cards recommended  observation  - compensated, continue torsemide, Aldactone.   -  Losartan and diltiazem were stopped.   - on ASA (given for 7 days) , ezetimibe 10 mg, lipitor 80 mg, coreg 25 mg bid, plavix (indefinitely)  - will f/u with cardiology as OP        Paroxysmal atrial fibrillation (HC)  - Anticoagulation monitoring, INR range 2-3  - PFO (patent foramen ovale)  - Echo with grossly NL EF- see above for detail.   - on coumadin, will f/u with INR clinic after discharge        Diabetes mellitus with other circulatroy complication, with long-term current nickolas of insulin (HC)  -  A1C 7.5% 12/19/19  - hypoglycemia while hospitalized, insulin reduced, then titrated up.   - on humalog mix and SSI.        Chronic hypercapnic respiratory insufficiency (H)  Morbid obesity with BMI of 45.0-49.9, adult (HC)  MADAY treated with BiPAP  Restrictive lung disease secondary to obesity   - back to baseline. Continue to monitor.   - counseled on cutting down on carbs.         Depression, major, recurrent, moderate (HC): stable. continue cymbalta     CKD  Stage 4, GFR 26 ml/min on Jan 23rd (HC)  - LUE AV fistula in place over left antecubital fossa- has been there for years. NOT on HD.   - Avoid nephrotoxic drugs. Renal dose the medications.   - labs stable in TCU        Hx of R BKA  (H)  Chronic opioid use  - on gabapentin 100 mg tid,  However GFR is < 50 ml/min, will change to 150 mg bid  - on OxyContin and oxycodone.  Analgesia optimal.      Gout: no flare up. On allopurinol 300 mg.     Restless legs syndrome (RLS): stable, continue ropinirole.    Past Medical History:  has no past medical history on file.    Discharge Medications:    Current Outpatient Medications   Medication Sig Dispense Refill     Acetaminophen (TYLENOL PO) Take 1,000 mg by mouth every 6 hours as needed        allopurinol (ZYLOPRIM) 100 MG tablet Take 300 mg by mouth daily       atorvastatin (LIPITOR) 80 MG tablet Take 80 mg by mouth daily       carvedilol (COREG) 12.5 MG  "tablet Take 25 mg by mouth 2 times daily (with meals)       cholecalciferol (VITAMIN D3) 5000 UNITS CAPS capsule Take by mouth daily       clopidogrel (PLAVIX) 75 MG tablet Take 75 mg by mouth daily        diclofenac (VOLTAREN) 1 % topical gel Place 2 g onto the skin 4 times daily       DULoxetine (CYMBALTA) 60 MG capsule Take 60 mg by mouth 2 times daily       ezetimibe (ZETIA) 10 MG tablet Take 10 mg by mouth every morning       gabapentin (NEURONTIN) 100 MG capsule Take 100 mg by mouth 3 times daily        hydrALAZINE (APRESOLINE) 10 MG tablet Take 20 mg by mouth 3 times daily       hypromellose (ARTIFICIAL TEARS) 0.5 % SOLN ophthalmic solution 1 drop every 2 hours as needed for dry eyes       insulin lispro (ADMELOG SOLOSTAR) 100 UNIT/ML pen Inject Subcutaneous 3 times daily (before meals) Inject as per sliding scale: if 70 - 149 = 0 <70 see hypoglycemia protocol; 150 - 199 = 3; 200 - 249 = 6; 250 - 299 = 9; 300 - 349 = 12; 350 - 399 = 15; 400 - 999 = 18 400 or > give 18 units and call MD, subcutaneously before meals       insulin lispro prot & lispro (HUMALOG MIX 75/25 KWIKPEN) (75-25) 100 UNIT/ML pen Inject 65 Units Subcutaneous every evening       insulin lispro prot & lispro (HUMALOG MIX 75/25 KWIKPEN) (75-25) 100 UNIT/ML pen Inject 55 Units Subcutaneous every evening       insulin lispro prot & lispro (HUMALOG MIX 75/25 KWIKPEN) (75-25) 100 UNIT/ML pen Inject 160 Units Subcutaneous every morning        insulin syringe-needle U-100 (ULTICARE INSULIN SYRINGE) 30G X 1/2\" 0.5 ML Use 3 syringes daily or as directed.       isosorbide mononitrate CR (IMDUR) 120 MG 24 HR ER tablet Take 120 mg by mouth every morning       Lidocaine (LIDOCARE) 4 % Patch Apply to PATCH TO PAINFUL AREA topically two times a day related to SCIATICA, UNSPECIFIED SIDE (M54.30);PAIN IN THORACIC SPINE (M54.6) NEED TO LIST ON AND OFF 12 HOURS APART!!!!!!!       mometasone (ELOCON) 0.1 % external cream Apply topically daily Apply to external " ears topically as needed       nitroglycerin (NITROSTAT) 0.4 MG SL tablet Place under the tongue every 5 minutes as needed       ORDER FOR DME Oxygen-2 liters per minute at bedtime for BIPAP       ORDER FOR DME BiPap-as directed       OXYCODONE 10 MG PO 12 hr tablet Take 1 tablet (10 mg) by mouth every 12 hours for 20 days 40 tablet 0     OXYCODONE HCL 5 MG PO TABA Take 7.5 mg by mouth every 4 hours as needed (severe  pain) Give 1.5 tablet by mouth every 4 hours as needed for PAIN 7. MG Q4H PRN -- NOT OXY IR IN TABLET FORM 30 each 0     Polyethylene Glycol 3350 (MIRALAX PO) Take 17 g by mouth 2 times daily as needed        rOPINIRole (REQUIP) 0.25 MG tablet Take 0.25 mg by mouth At Bedtime       sennosides (SENOKOT) 8.6 MG tablet Take 2 tablets by mouth 2 times daily       torsemide (DEMADEX) 20 MG tablet Give 4 tablet by mouth every 12 hours       vitamin B-12 (CYANOCOBALAMIN) 500 MCG tablet Take 500 mcg by mouth every morning       Warfarin Sodium (COUMADIN PO) Take 1 mg by mouth At Bedtime Recheck INR 2/14/20         Medication Changes/Rationale:     OK to send patient with all 5 day of supply of oxycodone ER 10 mg tab.       ROS:   4 point ROS including Respiratory, CV, GI and , other than that noted in the HPI,  is negative    Physical Exam:   Vitals: BP (!) 149/72   Pulse 83   Temp 98  F (36.7  C)   Resp 16   Wt (!) 166.9 kg (368 lb)   SpO2 90%   BMI 54.32 kg/m    BMI= Body mass index is 54.32 kg/m .  GENERAL APPEARANCE:  Alert, in no distress, morbidly obese  RESP:  lungs clear to auscultation , diminished breath sounds t/o, no adventatious breath sounds  CV:  irregular rhythm , reg rate, generalized edema  ABDOMEN:  obese, semi firm, non tender, + BS's  SKIN:  Inspection of skin and subcutaneous tissue baseline  PSYCH:  oriented X 3, affect and mood normal     SNF labs:   Most Recent 3 CBC's:  Recent Labs   Lab Test 02/20/20  0805   WBC 7.7   HGB 10.6*   MCV 89   *     Most Recent 3  BMP's:  Recent Labs   Lab Test 02/20/20  0805 02/13/20  0735 01/25/19  1238    137 135   POTASSIUM 3.4 3.5 4.4   CHLORIDE 99 98 96   CO2 34* 37* 36*   BUN 48* 47* 55*   CR 1.89* 1.79* 1.99*   ANIONGAP 6 2* 3   DAVID 8.5 8.5 8.4*   * 130* 309*     Most Recent 2 LFT's:  Recent Labs   Lab Test 02/20/20  0805   AST 16   ALT 27   ALKPHOS 101   BILITOTAL 0.5         DISCHARGE PLAN:    Follow up labs: per PCP    Medical Follow Up:      Follow up with PCP in 3-5 days    MTM referral needed and placed by this provider: No    Current [place] scheduled appointments:   none    Discharge Services: Home Care:  Occupational Therapy, Physical Therapy, Registered Nurse and From:  [place] [facility]    Discharge Instructions Verbalized to Patient at Discharge:     None      TOTAL DISCHARGE TIME:   Greater than 30 minutes  Electronically signed by:  RUY Jacobo CNP     Home care Face to Face documentation done in [facility] attached to Home care orders for [facility].